# Patient Record
Sex: FEMALE | Race: WHITE | NOT HISPANIC OR LATINO | URBAN - NONMETROPOLITAN AREA
[De-identification: names, ages, dates, MRNs, and addresses within clinical notes are randomized per-mention and may not be internally consistent; named-entity substitution may affect disease eponyms.]

---

## 2020-06-01 ENCOUNTER — OFFICE VISIT (OUTPATIENT)
Dept: FAMILY MEDICINE CLINIC | Facility: CLINIC | Age: 50
End: 2020-06-01

## 2020-06-01 VITALS
DIASTOLIC BLOOD PRESSURE: 70 MMHG | TEMPERATURE: 97.3 F | HEIGHT: 67 IN | HEART RATE: 87 BPM | WEIGHT: 179 LBS | BODY MASS INDEX: 28.09 KG/M2 | SYSTOLIC BLOOD PRESSURE: 140 MMHG | OXYGEN SATURATION: 99 %

## 2020-06-01 DIAGNOSIS — G89.29 CHRONIC BILATERAL LOW BACK PAIN WITH RIGHT-SIDED SCIATICA: ICD-10-CM

## 2020-06-01 DIAGNOSIS — Z13.220 SCREENING FOR HYPERLIPIDEMIA: ICD-10-CM

## 2020-06-01 DIAGNOSIS — Z13.29 SCREENING FOR THYROID DISORDER: ICD-10-CM

## 2020-06-01 DIAGNOSIS — Z76.89 ENCOUNTER TO ESTABLISH CARE WITH NEW DOCTOR: Primary | ICD-10-CM

## 2020-06-01 DIAGNOSIS — R93.7 ABNORMAL X-RAY OF LUMBAR SPINE: ICD-10-CM

## 2020-06-01 DIAGNOSIS — M25.551 RIGHT HIP PAIN: ICD-10-CM

## 2020-06-01 DIAGNOSIS — Z11.59 NEED FOR HEPATITIS C SCREENING TEST: ICD-10-CM

## 2020-06-01 DIAGNOSIS — M54.41 CHRONIC BILATERAL LOW BACK PAIN WITH RIGHT-SIDED SCIATICA: ICD-10-CM

## 2020-06-01 DIAGNOSIS — R53.82 CHRONIC FATIGUE: ICD-10-CM

## 2020-06-01 DIAGNOSIS — Z01.419 WOMEN'S ANNUAL ROUTINE GYNECOLOGICAL EXAMINATION: ICD-10-CM

## 2020-06-01 PROCEDURE — 99203 OFFICE O/P NEW LOW 30 MIN: CPT | Performed by: FAMILY MEDICINE

## 2020-06-01 NOTE — PATIENT INSTRUCTIONS
Alcohol Use Disorder  Alcohol use disorder is when your drinking disrupts your daily life. When you have this condition, you drink too much alcohol and you cannot control your drinking.  Alcohol use disorder can cause serious problems with your physical health. It can affect your brain, heart, liver, pancreas, immune system, stomach, and intestines. Alcohol use disorder can increase your risk for certain cancers and cause problems with your mental health, such as depression, anxiety, psychosis, delirium, and dementia. People with this disorder risk hurting themselves and others.  What are the causes?  This condition is caused by drinking too much alcohol over time. It is not caused by drinking too much alcohol only one or two times. Some people with this condition drink alcohol to cope with or escape from negative life events. Others drink to relieve pain or symptoms of mental illness.  What increases the risk?  You are more likely to develop this condition if:  · You have a family history of alcohol use disorder.  · Your culture encourages drinking to the point of intoxication, or makes alcohol easy to get.  · You had a mood or conduct disorder in childhood.  · You have been a victim of abuse.  · You are an adolescent and:  ? You have poor grades or difficulties in school.  ? Your caregivers do not talk to you about saying no to alcohol, or supervise your activities.  ? You are impulsive or you have trouble with self-control.  What are the signs or symptoms?  Symptoms of this condition include:  · Drinking more than you want to.  · Drinking for longer than you want to.  · Trying several times to drink less or to control your drinking.  · Spending a lot of time getting alcohol, drinking, or recovering from drinking.  · Craving alcohol.  · Having problems at work, at school, or at home due to drinking.  · Having problems in relationships due to drinking.  · Drinking when it is dangerous to drink, such as before  driving a car.  · Continuing to drink even though you know you might have a physical or mental problem related to drinking.  · Needing more and more alcohol to get the same effect you want from the alcohol (building up tolerance).  · Having symptoms of withdrawal when you stop drinking. Symptoms of withdrawal include:  ? Fatigue.  ? Nightmares.  ? Trouble sleeping.  ? Depression.  ? Anxiety.  ? Fever.  ? Seizures.  ? Severe confusion.  ? Feeling or seeing things that are not there (hallucinations).  ? Tremors.  ? Rapid heart rate.  ? Rapid breathing.  ? High blood pressure.  · Drinking to avoid symptoms of withdrawal.  How is this diagnosed?  This condition is diagnosed with an assessment. Your health care provider may start the assessment by asking three or four questions about your drinking.  Your health care provider may perform a physical exam or do lab tests to see if you have physical problems resulting from alcohol use. She or he may refer you to a mental health professional for evaluation.  How is this treated?  Some people with alcohol use disorder are able to reduce their alcohol use to low-risk levels. Others need to completely quit drinking alcohol. When necessary, mental health professionals with specialized training in substance use treatment can help. Your health care provider can help you decide how severe your alcohol use disorder is and what type of treatment you need. The following forms of treatment are available:  · Detoxification. Detoxification involves quitting drinking and using prescription medicines within the first week to help lessen withdrawal symptoms. This treatment is important for people who have had withdrawal symptoms before and for heavy drinkers who are likely to have withdrawal symptoms. Alcohol withdrawal can be dangerous, and in severe cases, it can cause death. Detoxification may be provided in a home, community, or primary care setting, or in a hospital or substance use  treatment facility.  · Counseling. This treatment is also called talk therapy. It is provided by substance use treatment counselors. A counselor can address the reasons you use alcohol and suggest ways to keep you from drinking again or to prevent problem drinking. The goals of talk therapy are to:  ? Find healthy activities and ways for you to cope with stress.  ? Identify and avoid the things that trigger your alcohol use.  ? Help you learn how to handle cravings.  · Medicines. Medicines can help treat alcohol use disorder by:  ? Decreasing alcohol cravings.  ? Decreasing the positive feeling you have when you drink alcohol.  ? Causing an uncomfortable physical reaction when you drink alcohol (aversion therapy).  · Support groups. Support groups are led by people who have quit drinking. They provide emotional support, advice, and guidance.  These forms of treatment are often combined. Some people with this condition benefit from a combination of treatments provided by specialized substance use treatment centers.  Follow these instructions at home:  · Take over-the-counter and prescription medicines only as told by your health care provider.  · Check with your health care provider before starting any new medicines.  · Ask friends and family members not to offer you alcohol.  · Avoid situations where alcohol is served, including gatherings where others are drinking alcohol.  · Create a plan for what to do when you are tempted to use alcohol.  · Find hobbies or activities that you enjoy that do not include alcohol.  · Keep all follow-up visits as told by your health care provider. This is important.  How is this prevented?  · If you drink, limit alcohol intake to no more than 1 drink a day for nonpregnant women and 2 drinks a day for men. One drink equals 12 oz of beer, 5 oz of wine, or 1½ oz of hard liquor.  · If you have a mental health condition, get treatment and support.  · Do not give alcohol to  adolescents.  · If you are an adolescent:  ? Do not drink alcohol.  ? Do not be afraid to say no if someone offers you alcohol. Speak up about why you do not want to drink. You can be a positive role model for your friends and set a good example for those around you by not drinking alcohol.  ? If your friends drink, spend time with others who do not drink alcohol. Make new friends who do not use alcohol.  ? Find healthy ways to manage stress and emotions, such as meditation or deep breathing, exercise, spending time in nature, listening to music, or talking with a trusted friend or family member.  Contact a health care provider if:  · You are not able to take your medicines as told.  · Your symptoms get worse.  · You return to drinking alcohol (relapse) and your symptoms get worse.  Get help right away if:  · You have thoughts about hurting yourself or others.  If you ever feel like you may hurt yourself or others, or have thoughts about taking your own life, get help right away. You can go to your nearest emergency department or call:  · Your local emergency services (911 in the [place]  · A suicide crisis helpline, such as the National Suicide Prevention Lifeline at 1-178.720.3832. This is open 24 hours a day.  Summary  · Alcohol use disorder is when your drinking disrupts your daily life. When you have this condition, you drink too much alcohol and you cannot control your drinking.  · Treatment may include detoxification, counseling, medicine, and support groups.  · Ask friends and family members not to offer you alcohol. Avoid situations where alcohol is served.  · Get help right away if you have thoughts about hurting yourself or others.  This information is not intended to replace advice given to you by your health care provider. Make sure you discuss any questions you have with your health care provider.  Document Released: 01/25/2006 Document Revised: 11/30/2018 Document Reviewed: 09/14/2017  Elsevier Patient  Education © 2020 [employer]  Smoking Tobacco Information, Adult  Smoking tobacco can be harmful to your health. Tobacco contains a poisonous (toxic), colorless chemical called nicotine. Nicotine is addictive. It changes the brain and can make it hard to stop smoking. Tobacco also has other toxic chemicals that can hurt your body and raise your risk of many cancers.  How can smoking tobacco affect me?  Smoking tobacco puts you at risk for:  · Cancer. Smoking is most commonly associated with lung cancer, but can also lead to cancer in other parts of the body.  · Chronic obstructive pulmonary disease (COPD). This is a long-term lung condition that makes it hard to breathe. It also gets worse over time.  · High blood pressure (hypertension), heart disease, stroke, or heart attack.  · Lung infections, such as pneumonia.  · Cataracts. This is when the lenses in the eyes become clouded.  · Digestive problems. This may include peptic ulcers, heartburn, and gastroesophageal reflux disease (GERD).  · Oral health problems, such as gum disease and tooth loss.  · Loss of taste and smell.  Smoking can affect your appearance by causing:  · Wrinkles.  · Yellow or stained teeth, fingers, and fingernails.  Smoking tobacco can also affect your social life, because:  · It may be challenging to find places to smoke when away from home. Many workplaces, restaurants, hotels, and public places are tobacco-free.  · Smoking is expensive. This is due to the cost of tobacco and the long-term costs of treating health problems from smoking.  · Secondhand smoke may affect those around you. Secondhand smoke can cause lung cancer, breathing problems, and heart disease. Children of smokers have a higher risk for:  ? Sudden infant death syndrome (SIDS).  ? Ear infections.  ? Lung infections.  If you currently smoke tobacco, quitting now can help you:  · Lead a longer and healthier life.  · Look, smell, breathe, and feel better over time.  · Save  money.  · Protect others from the harms of secondhand smoke.  What actions can I take to prevent health problems?  Quit smoking    · Do not start smoking. Quit if you already do.  · Make a plan to quit smoking and commit to it. Look for programs to help you and ask your health care provider for recommendations and ideas.  · Set a date and write down all the reasons you want to quit.  · Let your friends and family know you are quitting so they can help and support you. Consider finding friends who also want to quit. It can be easier to quit with someone else, so that you can support each other.  · Talk with your health care provider about using nicotine replacement medicines to help you quit, such as gum, lozenges, patches, sprays, or pills.  · Do not replace cigarette smoking with electronic cigarettes, which are commonly called e-cigarettes. The safety of e-cigarettes is not known, and some may contain harmful chemicals.  · If you try to quit but return to smoking, stay positive. It is common to slip up when you first quit, so take it one day at a time.  · Be prepared for cravings. When you feel the urge to smoke, chew gum or suck on hard candy.  Lifestyle  · Stay busy and take care of your body.  · Drink enough fluid to keep your urine pale yellow.  · Get plenty of exercise and eat a healthy diet. This can help prevent weight gain after quitting.  · Monitor your eating habits. Quitting smoking can cause you to have a larger appetite than when you smoke.  · Find ways to relax. Go out with friends or family to a movie or a restaurant where people do not smoke.  · Ask your health care provider about having regular tests (screenings) to check for cancer. This may include blood tests, imaging tests, and other tests.  · Find ways to manage your stress, such as meditation, yoga, or exercise.  Where to find support  To get support to quit smoking, consider:  · Asking your health care provider for more information and  resources.  · Taking classes to learn more about quitting smoking.  · Looking for local organizations that offer resources about quitting smoking.  · Joining a support group for people who want to quit smoking in your local community.  · Calling the smokefree.gov counselor helpline: 1-800-Quit-Now (1-308.642.8044)  Where to find more information  You may find more information about quitting smoking from:  · HelpGuide.org: www.helpguide.org  · Smokefree.gov: smokefree.gov  · American Lung Association: www.lung.org  Contact a health care provider if you:  · Have problems breathing.  · Notice that your lips, nose, or fingers turn blue.  · Have chest pain.  · Are coughing up blood.  · Feel faint or you pass out.  · Have other health changes that cause you to worry.  Summary  · Smoking tobacco can negatively affect your health, the health of those around you, your finances, and your social life.  · Do not start smoking. Quit if you already do. If you need help quitting, ask your health care provider.  · Think about joining a support group for people who want to quit smoking in your local community. There are many effective programs that will help you to quit this behavior.  This information is not intended to replace advice given to you by your health care provider. Make sure you discuss any questions you have with your health care provider.  Document Released: 01/02/2018 Document Revised: 02/06/2019 Document Reviewed: 01/02/2018  Elsevier Patient Education © 2020 [employer]

## 2020-06-01 NOTE — PROGRESS NOTES
Chief Complaint   Patient presents with   • Back Pain   • Establish Care       Subjective:  Sangita Pradhan is a 49 y.o. female who presents today to establish care and discuss chronic low back and right hip pain.  Patient has not seen a primary care provider in some time and is due for all routine screenings.  Her medical history is fairly unremarkable and she denies history other than chronic low back pain.  She reports her chronic back pain is been present for greater than 10 years but has been worse over the last several months.  She does not recall injuring her back or do anything to exacerbate this.  She did previously work at [employer] and reports that prolonged standing and activity at work caused her symptoms to be worse.  Pain radiates across her lower back and down to her right buttocks and around to right groin.  It does occasionally radiate down her leg but stops above the knee.  It is achy and sharp in nature.  She rates it as a 2 out of 10 currently but states when it is severe it gets as bad as a 10 out of 10.  It is present constantly but waxes and wanes in severity.  She denies any weakness or numbness or tingling.  Denies any bowel or bladder incontinence.  No saddle or groin anesthesia.  She has been using heat with some relief of her pain.  She is also using diclofenac that she got at the urgent care.  She was seen at the urgent care here at [facility] in May 2020 and had an x-ray that showed degenerative disease changes in her lumbar spine.  She was started on diclofenac which has helped some but has not resolved her pain.  She has not had any further imaging.  She has never tried physical therapy.    Patient is due for all screening lab work at this time.  She is also due for a Pap as she has not had one in over 10 years.  Patient does smoke 1 pack/day and has done so for greater than 30 years.  She does not wish to quit at this time.  She does drink around 18 beers every weekend.  Reports  "she does not drink during the week but binge drinks on the weekend.  She has been doing this fairly regularly for some time.  She is not interested in quitting drinking or smoking at this time.    She has no further concerns or questions at this time.      The following portions of the patient's history were reviewed and updated as appropriate: allergies, current medications, past family history, past medical history, past social history, past surgical history and problem list.      Past Medical History:   Diagnosis Date   • Chronic low back pain          Current Outpatient Medications:   •  diclofenac (VOLTAREN) 50 MG EC tablet, Take 1 tablet by mouth 2 (Two) Times a Day As Needed (back pain)., Disp: 60 tablet, Rfl: 0    Review of Systems    Review of Systems   Constitutional: Negative for activity change, chills, fever and unexpected weight change.   HENT: Negative for hearing loss, rhinorrhea and sore throat.    Eyes: Negative for visual disturbance.   Respiratory: Negative for cough and shortness of breath.    Cardiovascular: Negative for chest pain, palpitations and leg swelling.   Gastrointestinal: Negative for abdominal pain, blood in stool, constipation, diarrhea, nausea and vomiting.   Endocrine: Negative for polydipsia, polyphagia and polyuria.   Genitourinary: Negative for dysuria, frequency, hematuria and urgency.   Musculoskeletal: Positive for back pain. Negative for arthralgias, gait problem, joint swelling and myalgias.        Chronic right hip pain   Skin: Negative for rash.   Neurological: Negative for dizziness, light-headedness, numbness and headaches.   Psychiatric/Behavioral: Negative for sleep disturbance and suicidal ideas.       Objective  Vitals:    06/01/20 1036   BP: 140/70   BP Location: Left arm   Patient Position: Sitting   Pulse: 87   Temp: 97.3 °F (36.3 °C)   TempSrc: Tympanic   SpO2: 99%   Weight: 81.2 kg (179 lb)   Height: 170.2 cm (67\")   PainSc:   2   PainLoc: Hip       Physical " Exam   Constitutional: She is oriented to person, place, and time. Vital signs are normal. She appears well-developed and well-nourished. She is active.  Non-toxic appearance. She does not have a sickly appearance. She does not appear ill. No distress.   HENT:   Head: Normocephalic and atraumatic.   Right Ear: Hearing and external ear normal.   Left Ear: Hearing and external ear normal.   Nose: Nose normal.   Eyes: Pupils are equal, round, and reactive to light. Conjunctivae, EOM and lids are normal. Right eye exhibits no discharge. Left eye exhibits no discharge. No scleral icterus. Right eye exhibits normal extraocular motion and no nystagmus. Left eye exhibits normal extraocular motion and no nystagmus.   Neck: Trachea normal, normal range of motion, full passive range of motion without pain and phonation normal. Neck supple. No tracheal tenderness present. No neck rigidity. No tracheal deviation, no edema, no erythema and normal range of motion present. No thyroid mass and no thyromegaly present.   Cardiovascular: Normal rate, regular rhythm, S1 normal, S2 normal and normal heart sounds. PMI is not displaced. Exam reveals no gallop, no S3, no S4, no distant heart sounds and no friction rub.   No murmur heard.   No systolic murmur is present.   No diastolic murmur is present.  Pulmonary/Chest: Effort normal and breath sounds normal. No accessory muscle usage or stridor. No apnea, no tachypnea and no bradypnea. No respiratory distress. She has no decreased breath sounds. She has no wheezes. She has no rhonchi. She has no rales.   Abdominal: Normal appearance.   Musculoskeletal: She exhibits no edema.        Right hip: She exhibits tenderness. She exhibits normal range of motion, normal strength, no bony tenderness, no swelling, no crepitus, no deformity and no laceration.        Lumbar back: She exhibits tenderness and pain. She exhibits normal range of motion, no bony tenderness, no swelling, no edema, no  deformity, no laceration, no spasm and normal pulse.   Positive leg raise test on the right.  Negative leg raise test on the left.   Lymphadenopathy:     She has no cervical adenopathy.   Neurological: She is alert and oriented to person, place, and time. She has normal strength. She is not disoriented. She displays no atrophy and no tremor. No cranial nerve deficit or sensory deficit. She exhibits normal muscle tone. She displays no seizure activity. Coordination and gait normal.   Reflex Scores:       Patellar reflexes are 2+ on the right side and 2+ on the left side.  Strength 5 out of 5 throughout lower extremities bilaterally.   Skin: Skin is warm and dry. No rash noted. She is not diaphoretic. No erythema.   Psychiatric: She has a normal mood and affect. Her behavior is normal. Judgment and thought content normal.   Nursing note and vitals reviewed.        Sangita was seen today for back pain and establish care.    Diagnoses and all orders for this visit:    Encounter to establish care with new doctor  Complete medical history, surgical history, social history, and family history reviewed.  Recent imaging and notes reviewed.    Chronic bilateral low back pain with right-sided sciatica  Continue conservative therapy with NSAIDs and heat at this time.  Discussed adding stretches and strengthening exercises.  Referral to physical therapy made for further evaluation and treatment.  We will go ahead and order MRI of lumbar spine due to abnormal findings on x-ray with radicular symptoms.  Will make further recommendation based on MRI findings as well as response to physical therapy.  Discussed risks of NSAIDs including increased risks of vascular disease, renal injury, and GI irritation.  Discussed taking these with food if GI issues occur and drinking plenty fluids stay well-hydrated.  Take only as needed.  Patient verbalized understanding and is agreeable with plan.  -     Ambulatory Referral to Physical Therapy  Evaluate and treat; Stretching, ROM, Strengthening  -     MRI Lumbar Spine Without Contrast; Future    Abnormal x-ray of lumbar spine  -     MRI Lumbar Spine Without Contrast; Future    Right hip pain  -     XR Hip With or Without Pelvis 2 - 3 View Right; Future    Chronic fatigue  -     CBC w AUTO Differential; Future  -     Comprehensive metabolic panel; Future  -     TSH; Future    Screening for thyroid disorder  -     TSH; Future    Screening for hyperlipidemia  -     Lipid panel; Future    Need for hepatitis C screening test  -     Hepatitis C antibody; Future    Women's annual routine gynecological examination  -     Ambulatory Referral to Gynecology    Tobacco abuse disorder  Discussed risks of tobacco use.  Discussed cessation methods available.  Patient declines cessation at this time.  Will follow and continue to encourage patient to quit smoking in the future.  Approximately 5 minutes was used counseling patient.    Alcohol abuse disorder  Discussed risks of alcohol abuse.  Discussed CDC recommendation of one 12 oz. beer daily for females.  Discussed need to decrease binge drinking.  Patient verbalized understanding but does not wish to get nondrinking at this time.    I spent 35 minutes in direct face to face contact with patient.  Greater than 50% of this time was spent counseling patient and discussing plan of care.    PHQ-2/PHQ-9 Depression Screening 6/1/2020   Little interest or pleasure in doing things 1   Feeling down, depressed, or hopeless 0   Total Score 1           This document has been electronically signed by Da Epperson DO on June 1, 2020 16:05

## 2020-06-03 ENCOUNTER — HOSPITAL ENCOUNTER (OUTPATIENT)
Dept: MRI IMAGING | Facility: HOSPITAL | Age: 50
Discharge: HOME | End: 2020-06-03
Admitting: FAMILY MEDICINE

## 2020-06-03 ENCOUNTER — LAB (OUTPATIENT)
Dept: LAB | Facility: HOSPITAL | Age: 50
End: 2020-06-03

## 2020-06-03 ENCOUNTER — TELEPHONE (OUTPATIENT)
Dept: FAMILY MEDICINE CLINIC | Facility: CLINIC | Age: 50
End: 2020-06-03

## 2020-06-03 DIAGNOSIS — R53.82 CHRONIC FATIGUE: ICD-10-CM

## 2020-06-03 DIAGNOSIS — Z13.220 SCREENING FOR HYPERLIPIDEMIA: ICD-10-CM

## 2020-06-03 DIAGNOSIS — M54.41 CHRONIC BILATERAL LOW BACK PAIN WITH RIGHT-SIDED SCIATICA: ICD-10-CM

## 2020-06-03 DIAGNOSIS — Z11.59 NEED FOR HEPATITIS C SCREENING TEST: ICD-10-CM

## 2020-06-03 DIAGNOSIS — Z13.29 SCREENING FOR THYROID DISORDER: ICD-10-CM

## 2020-06-03 DIAGNOSIS — R93.7 ABNORMAL X-RAY OF LUMBAR SPINE: ICD-10-CM

## 2020-06-03 DIAGNOSIS — G89.29 CHRONIC BILATERAL LOW BACK PAIN WITH RIGHT-SIDED SCIATICA: ICD-10-CM

## 2020-06-03 PROCEDURE — 72148 MRI LUMBAR SPINE W/O DYE: CPT

## 2020-06-03 PROCEDURE — 84443 ASSAY THYROID STIM HORMONE: CPT

## 2020-06-03 PROCEDURE — 36415 COLL VENOUS BLD VENIPUNCTURE: CPT

## 2020-06-03 PROCEDURE — 80053 COMPREHEN METABOLIC PANEL: CPT

## 2020-06-03 PROCEDURE — 80061 LIPID PANEL: CPT

## 2020-06-03 PROCEDURE — 85025 COMPLETE CBC W/AUTO DIFF WBC: CPT

## 2020-06-03 PROCEDURE — 86803 HEPATITIS C AB TEST: CPT

## 2020-06-04 LAB
ALBUMIN SERPL-MCNC: 4 G/DL (ref 3.5–5.2)
ALBUMIN/GLOB SERPL: 1.4 G/DL
ALP SERPL-CCNC: 55 U/L (ref 39–117)
ALT SERPL W P-5'-P-CCNC: 10 U/L (ref 1–33)
ANION GAP SERPL CALCULATED.3IONS-SCNC: 10.7 MMOL/L (ref 5–15)
AST SERPL-CCNC: 14 U/L (ref 1–32)
BASOPHILS # BLD AUTO: 0.08 10*3/MM3 (ref 0–0.2)
BASOPHILS NFR BLD AUTO: 0.6 % (ref 0–1.5)
BILIRUB SERPL-MCNC: <0.2 MG/DL (ref 0.2–1.2)
BUN BLD-MCNC: 10 MG/DL (ref 6–20)
BUN/CREAT SERPL: 14.3 (ref 7–25)
CALCIUM SPEC-SCNC: 9 MG/DL (ref 8.6–10.5)
CHLORIDE SERPL-SCNC: 107 MMOL/L (ref 98–107)
CHOLEST SERPL-MCNC: 156 MG/DL (ref 0–200)
CO2 SERPL-SCNC: 21.3 MMOL/L (ref 22–29)
CREAT BLD-MCNC: 0.7 MG/DL (ref 0.57–1)
DEPRECATED RDW RBC AUTO: 47.7 FL (ref 37–54)
EOSINOPHIL # BLD AUTO: 0.15 10*3/MM3 (ref 0–0.4)
EOSINOPHIL NFR BLD AUTO: 1.2 % (ref 0.3–6.2)
ERYTHROCYTE [DISTWIDTH] IN BLOOD BY AUTOMATED COUNT: 13.8 % (ref 12.3–15.4)
GFR SERPL CREATININE-BSD FRML MDRD: 89 ML/MIN/1.73
GLOBULIN UR ELPH-MCNC: 2.8 GM/DL
GLUCOSE BLD-MCNC: 104 MG/DL (ref 65–99)
HCT VFR BLD AUTO: 41.4 % (ref 34–46.6)
HCV AB SER DONR QL: NORMAL
HDLC SERPL-MCNC: 56 MG/DL (ref 40–60)
HGB BLD-MCNC: 14.1 G/DL (ref 12–15.9)
IMM GRANULOCYTES # BLD AUTO: 0.04 10*3/MM3 (ref 0–0.05)
IMM GRANULOCYTES NFR BLD AUTO: 0.3 % (ref 0–0.5)
LDLC SERPL CALC-MCNC: 82 MG/DL (ref 0–100)
LDLC/HDLC SERPL: 1.47 {RATIO}
LYMPHOCYTES # BLD AUTO: 3.51 10*3/MM3 (ref 0.7–3.1)
LYMPHOCYTES NFR BLD AUTO: 28.2 % (ref 19.6–45.3)
MCH RBC QN AUTO: 31.8 PG (ref 26.6–33)
MCHC RBC AUTO-ENTMCNC: 34.1 G/DL (ref 31.5–35.7)
MCV RBC AUTO: 93.2 FL (ref 79–97)
MONOCYTES # BLD AUTO: 0.82 10*3/MM3 (ref 0.1–0.9)
MONOCYTES NFR BLD AUTO: 6.6 % (ref 5–12)
NEUTROPHILS # BLD AUTO: 7.83 10*3/MM3 (ref 1.7–7)
NEUTROPHILS NFR BLD AUTO: 63.1 % (ref 42.7–76)
NRBC BLD AUTO-RTO: 0 /100 WBC (ref 0–0.2)
PLATELET # BLD AUTO: 392 10*3/MM3 (ref 140–450)
PMV BLD AUTO: 9.7 FL (ref 6–12)
POTASSIUM BLD-SCNC: 5.2 MMOL/L (ref 3.5–5.2)
PROT SERPL-MCNC: 6.8 G/DL (ref 6–8.5)
RBC # BLD AUTO: 4.44 10*6/MM3 (ref 3.77–5.28)
SODIUM BLD-SCNC: 139 MMOL/L (ref 136–145)
TRIGL SERPL-MCNC: 89 MG/DL (ref 0–150)
TSH SERPL DL<=0.05 MIU/L-ACNC: 0.74 UIU/ML (ref 0.27–4.2)
VLDLC SERPL-MCNC: 17.8 MG/DL (ref 5–40)
WBC NRBC COR # BLD: 12.43 10*3/MM3 (ref 3.4–10.8)

## 2020-06-11 ENCOUNTER — HOSPITAL ENCOUNTER (EMERGENCY)
Facility: HOSPITAL | Age: 50
Discharge: HOME | End: 2020-06-11
Attending: EMERGENCY MEDICINE | Admitting: EMERGENCY MEDICINE

## 2020-06-11 ENCOUNTER — APPOINTMENT (OUTPATIENT)
Dept: CT IMAGING | Facility: HOSPITAL | Age: 50
End: 2020-06-11

## 2020-06-11 VITALS
TEMPERATURE: 98.2 F | WEIGHT: 180 LBS | OXYGEN SATURATION: 98 % | RESPIRATION RATE: 18 BRPM | SYSTOLIC BLOOD PRESSURE: 161 MMHG | HEIGHT: 67 IN | HEART RATE: 63 BPM | BODY MASS INDEX: 28.25 KG/M2 | DIASTOLIC BLOOD PRESSURE: 62 MMHG

## 2020-06-11 DIAGNOSIS — N20.0 KIDNEY STONES: ICD-10-CM

## 2020-06-11 DIAGNOSIS — R11.10 HYPEREMESIS: Primary | ICD-10-CM

## 2020-06-11 DIAGNOSIS — R10.13 EPIGASTRIC PAIN: ICD-10-CM

## 2020-06-11 LAB
ALBUMIN SERPL-MCNC: 3.9 G/DL (ref 3.5–5.2)
ALBUMIN/GLOB SERPL: 1.3 G/DL
ALP SERPL-CCNC: 54 U/L (ref 39–117)
ALT SERPL W P-5'-P-CCNC: 12 U/L (ref 1–33)
ANION GAP SERPL CALCULATED.3IONS-SCNC: 14 MMOL/L (ref 5–15)
AST SERPL-CCNC: 14 U/L (ref 1–32)
BASOPHILS # BLD AUTO: 0.05 10*3/MM3 (ref 0–0.2)
BASOPHILS NFR BLD AUTO: 0.2 % (ref 0–1.5)
BILIRUB SERPL-MCNC: 0.3 MG/DL (ref 0.2–1.2)
BILIRUB UR QL STRIP: NEGATIVE
BUN BLD-MCNC: 11 MG/DL (ref 6–20)
BUN/CREAT SERPL: 17.7 (ref 7–25)
CALCIUM SPEC-SCNC: 9 MG/DL (ref 8.6–10.5)
CHLORIDE SERPL-SCNC: 104 MMOL/L (ref 98–107)
CLARITY UR: CLEAR
CO2 SERPL-SCNC: 21 MMOL/L (ref 22–29)
COLOR UR: YELLOW
CREAT BLD-MCNC: 0.62 MG/DL (ref 0.57–1)
DEPRECATED RDW RBC AUTO: 48.9 FL (ref 37–54)
EOSINOPHIL # BLD AUTO: 0.01 10*3/MM3 (ref 0–0.4)
EOSINOPHIL NFR BLD AUTO: 0 % (ref 0.3–6.2)
ERYTHROCYTE [DISTWIDTH] IN BLOOD BY AUTOMATED COUNT: 14.6 % (ref 12.3–15.4)
GFR SERPL CREATININE-BSD FRML MDRD: 102 ML/MIN/1.73
GLOBULIN UR ELPH-MCNC: 2.9 GM/DL
GLUCOSE BLD-MCNC: 149 MG/DL (ref 65–99)
GLUCOSE UR STRIP-MCNC: ABNORMAL MG/DL
HCG SERPL QL: NEGATIVE
HCT VFR BLD AUTO: 39.4 % (ref 34–46.6)
HGB BLD-MCNC: 13.7 G/DL (ref 12–15.9)
HGB UR QL STRIP.AUTO: NEGATIVE
IMM GRANULOCYTES # BLD AUTO: 0.15 10*3/MM3 (ref 0–0.05)
IMM GRANULOCYTES NFR BLD AUTO: 0.7 % (ref 0–0.5)
KETONES UR QL STRIP: ABNORMAL
LEUKOCYTE ESTERASE UR QL STRIP.AUTO: NEGATIVE
LIPASE SERPL-CCNC: 14 U/L (ref 13–60)
LYMPHOCYTES # BLD AUTO: 1.64 10*3/MM3 (ref 0.7–3.1)
LYMPHOCYTES NFR BLD AUTO: 7.8 % (ref 19.6–45.3)
MCH RBC QN AUTO: 31.9 PG (ref 26.6–33)
MCHC RBC AUTO-ENTMCNC: 34.8 G/DL (ref 31.5–35.7)
MCV RBC AUTO: 91.8 FL (ref 79–97)
MONOCYTES # BLD AUTO: 1.04 10*3/MM3 (ref 0.1–0.9)
MONOCYTES NFR BLD AUTO: 5 % (ref 5–12)
NEUTROPHILS # BLD AUTO: 18.12 10*3/MM3 (ref 1.7–7)
NEUTROPHILS NFR BLD AUTO: 86.3 % (ref 42.7–76)
NITRITE UR QL STRIP: NEGATIVE
NRBC BLD AUTO-RTO: 0 /100 WBC (ref 0–0.2)
PH UR STRIP.AUTO: 6 [PH] (ref 5–9)
PLATELET # BLD AUTO: 389 10*3/MM3 (ref 140–450)
PMV BLD AUTO: 8.8 FL (ref 6–12)
POTASSIUM BLD-SCNC: 3.3 MMOL/L (ref 3.5–5.2)
PROT SERPL-MCNC: 6.8 G/DL (ref 6–8.5)
PROT UR QL STRIP: ABNORMAL
RBC # BLD AUTO: 4.29 10*6/MM3 (ref 3.77–5.28)
SODIUM BLD-SCNC: 139 MMOL/L (ref 136–145)
SP GR UR STRIP: 1.07 (ref 1–1.03)
UROBILINOGEN UR QL STRIP: ABNORMAL
WBC NRBC COR # BLD: 21.01 10*3/MM3 (ref 3.4–10.8)

## 2020-06-11 PROCEDURE — 96375 TX/PRO/DX INJ NEW DRUG ADDON: CPT

## 2020-06-11 PROCEDURE — 84703 CHORIONIC GONADOTROPIN ASSAY: CPT | Performed by: STUDENT IN AN ORGANIZED HEALTH CARE EDUCATION/TRAINING PROGRAM

## 2020-06-11 PROCEDURE — 99284 EMERGENCY DEPT VISIT MOD MDM: CPT

## 2020-06-11 PROCEDURE — 85025 COMPLETE CBC W/AUTO DIFF WBC: CPT | Performed by: STUDENT IN AN ORGANIZED HEALTH CARE EDUCATION/TRAINING PROGRAM

## 2020-06-11 PROCEDURE — 36415 COLL VENOUS BLD VENIPUNCTURE: CPT | Performed by: STUDENT IN AN ORGANIZED HEALTH CARE EDUCATION/TRAINING PROGRAM

## 2020-06-11 PROCEDURE — 83690 ASSAY OF LIPASE: CPT | Performed by: STUDENT IN AN ORGANIZED HEALTH CARE EDUCATION/TRAINING PROGRAM

## 2020-06-11 PROCEDURE — 96376 TX/PRO/DX INJ SAME DRUG ADON: CPT

## 2020-06-11 PROCEDURE — 25010000002 MORPHINE PER 10 MG: Performed by: STUDENT IN AN ORGANIZED HEALTH CARE EDUCATION/TRAINING PROGRAM

## 2020-06-11 PROCEDURE — 80053 COMPREHEN METABOLIC PANEL: CPT | Performed by: STUDENT IN AN ORGANIZED HEALTH CARE EDUCATION/TRAINING PROGRAM

## 2020-06-11 PROCEDURE — 25010000002 IOPAMIDOL 61 % SOLUTION: Performed by: EMERGENCY MEDICINE

## 2020-06-11 PROCEDURE — 74177 CT ABD & PELVIS W/CONTRAST: CPT

## 2020-06-11 PROCEDURE — 81003 URINALYSIS AUTO W/O SCOPE: CPT | Performed by: STUDENT IN AN ORGANIZED HEALTH CARE EDUCATION/TRAINING PROGRAM

## 2020-06-11 PROCEDURE — 25010000002 ONDANSETRON PER 1 MG: Performed by: STUDENT IN AN ORGANIZED HEALTH CARE EDUCATION/TRAINING PROGRAM

## 2020-06-11 PROCEDURE — 96374 THER/PROPH/DIAG INJ IV PUSH: CPT

## 2020-06-11 PROCEDURE — 25010000002 MORPHINE PER 10 MG: Performed by: EMERGENCY MEDICINE

## 2020-06-11 RX ORDER — LIDOCAINE HYDROCHLORIDE 20 MG/ML
15 SOLUTION OROPHARYNGEAL ONCE
Status: DISCONTINUED | OUTPATIENT
Start: 2020-06-11 | End: 2020-06-11 | Stop reason: HOSPADM

## 2020-06-11 RX ORDER — ONDANSETRON 2 MG/ML
4 INJECTION INTRAMUSCULAR; INTRAVENOUS ONCE
Status: COMPLETED | OUTPATIENT
Start: 2020-06-11 | End: 2020-06-11

## 2020-06-11 RX ORDER — ONDANSETRON 4 MG/1
4 TABLET, FILM COATED ORAL EVERY 8 HOURS PRN
Qty: 9 TABLET | Refills: 0 | Status: SHIPPED | OUTPATIENT
Start: 2020-06-11 | End: 2020-06-22

## 2020-06-11 RX ORDER — ALUMINA, MAGNESIA, AND SIMETHICONE 2400; 2400; 240 MG/30ML; MG/30ML; MG/30ML
15 SUSPENSION ORAL ONCE
Status: DISCONTINUED | OUTPATIENT
Start: 2020-06-11 | End: 2020-06-11

## 2020-06-11 RX ORDER — SODIUM CHLORIDE 0.9 % (FLUSH) 0.9 %
10 SYRINGE (ML) INJECTION AS NEEDED
Status: DISCONTINUED | OUTPATIENT
Start: 2020-06-11 | End: 2020-06-11 | Stop reason: HOSPADM

## 2020-06-11 RX ADMIN — MORPHINE SULFATE 4 MG: 4 INJECTION, SOLUTION INTRAMUSCULAR; INTRAVENOUS at 16:52

## 2020-06-11 RX ADMIN — ONDANSETRON 4 MG: 2 INJECTION INTRAMUSCULAR; INTRAVENOUS at 16:44

## 2020-06-11 RX ADMIN — SODIUM CHLORIDE 1000 ML: 900 INJECTION, SOLUTION INTRAVENOUS at 16:44

## 2020-06-11 RX ADMIN — ONDANSETRON 4 MG: 2 INJECTION INTRAMUSCULAR; INTRAVENOUS at 19:04

## 2020-06-11 RX ADMIN — IOPAMIDOL 90 ML: 612 INJECTION, SOLUTION INTRAVENOUS at 19:13

## 2020-06-11 RX ADMIN — MORPHINE SULFATE 4 MG: 4 INJECTION, SOLUTION INTRAMUSCULAR; INTRAVENOUS at 19:03

## 2020-06-11 NOTE — ED NOTES
Pt presents to ED with C/O vomiting that began at approx 0600 today. Pt was seen at  prior to ED. Pt reports she ate [employer] last night.      Corinne Aggarwal RN  06/11/20 9133       Corinne Aggarwal RN  06/11/20 4000

## 2020-06-11 NOTE — ED NOTES
Pt vomiting and dry heaving at this time - GI cocktail not given     Corinne Aggarwal, RN  06/11/20 0692

## 2020-06-11 NOTE — ED PROVIDER NOTES
Subjective   Patient states that she ate [employer] last night at midnight, and this morning at 6 AM she started vomiting excessively.  She states that she could not continue to work today and went to the urgent care because she did not stop vomiting.  She has had persistent nausea, complains of abdominal pain that radiates to her back, states the pain is a 10/10, and states she is never had anything like this before.  She has not had any abdominal surgeries.  Patient denies any chest pain, shortness of breath, dizziness/lightheadedness/headache, dysuria, diarrhea/constipation.  She states the pain comes and goes with increased frequency.  Has not been able to tolerate anything p.o.  Nothing makes it better and it stays worse.  Patient states that she has a 40-pack-year smoking history.  She denies any other recreational drug drug use.  She states that she was put on Voltaren for back pain.  And she has an appointment with a doctor to review her lumbar MRI that was recently done on 2020.          Review of Systems   Constitutional: Negative for chills and fever.   HENT: Negative for rhinorrhea and sore throat.    Eyes: Negative for photophobia and visual disturbance.   Respiratory: Negative for cough and shortness of breath.    Cardiovascular: Negative for chest pain and palpitations.   Gastrointestinal: Positive for abdominal pain, nausea and vomiting.   Genitourinary: Negative for difficulty urinating and flank pain.   Musculoskeletal: Positive for back pain. Negative for arthralgias.   Neurological: Negative for dizziness, light-headedness and headaches.   Psychiatric/Behavioral: Negative for confusion. The patient is not nervous/anxious.        Past Medical History:   Diagnosis Date   • Chronic low back pain        Allergies   Allergen Reactions   • Codeine Hives       Past Surgical History:   Procedure Laterality Date   • ADENOIDECTOMY     •  SECTION      x 2    • CYST REMOVAL     • ESSURE TUBAL  LIGATION Bilateral    • TONSILLECTOMY     • WRIST SURGERY Left        Family History   Problem Relation Age of Onset   • Diabetes Mother    • Hyperlipidemia Mother    • Stroke Mother    • Thyroid disease Mother    • Arthritis Father    • Heart disease Father    • No Known Problems Sister    • No Known Problems Brother    • No Known Problems Daughter    • No Known Problems Son    • Breast cancer Maternal Aunt    • Coronary artery disease Paternal Uncle    • No Known Problems Sister    • No Known Problems Son        Social History     Socioeconomic History   • Marital status: Single     Spouse name: Not on file   • Number of children: Not on file   • Years of education: Not on file   • Highest education level: Not on file   Tobacco Use   • Smoking status: Current Every Day Smoker     Packs/day: 1.00     Years: 38.00     Pack years: 38.00   • Smokeless tobacco: Never Used   Substance and Sexual Activity   • Alcohol use: Yes     Alcohol/week: 18.0 standard drinks     Types: 18 Cans of beer per week     Comment: Drinks around 18 beer over the course of weekend    • Drug use: Never   • Sexual activity: Yes     Partners: Male     Birth control/protection: Surgical, Condom   Social History Narrative    Single. Currently not working, but recently worked for [employer]. Fired due to back pain and missing 3 days for this. Lives with daughter Kajal here in [place], [place].  Finished high school.            Objective   Physical Exam   Constitutional: She is oriented to person, place, and time. She appears well-developed and well-nourished. She has a sickly appearance. She appears distressed. Face mask in place.   HENT:   Head: Normocephalic.   Right Ear: Hearing normal.   Left Ear: Hearing normal.   Nose: Nose normal.   Mouth/Throat: Uvula is midline, oropharynx is clear and moist and mucous membranes are normal.   Eyes: Conjunctivae and lids are normal. Right pupil is not reactive. Left pupil is not reactive.   Neck:  Neck supple.   Cardiovascular: Normal rate, regular rhythm, normal heart sounds and intact distal pulses.   Pulmonary/Chest: Effort normal and breath sounds normal. She has no wheezes. She has no rhonchi. She has no rales.   Abdominal: Soft. Normal appearance and bowel sounds are normal. She exhibits no distension. There is tenderness in the right upper quadrant, right lower quadrant, epigastric area and periumbilical area. There is no guarding.   Neurological: She is alert and oriented to person, place, and time.   Skin: Skin is warm.   Psychiatric: She has a normal mood and affect. Her speech is normal and behavior is normal. Judgment and thought content normal. Cognition and memory are normal.   Vitals reviewed.      Procedures     None      ED Course  ED Course as of Jun 11 2007   Thu Jun 11, 2020   1653 CC: Hyperemesis.1000 cc NS bolus, CBC, CMP, hCG Qual, UA, lipase, CT abdomen pelvis with contrast.  Zofran 4 mg IV, morphine 4 mg IV.    [NA]   1856 Patient said improved pain and nausea but still continued. It was reduced. Gave another zofran and morphine dose IV. CT abdomen still pending.     [NA]   1954 CT abdomen had benign findings. Patient stable will discharge home with zofran.     [NA]      ED Course User Index  [NA] Gem Jean MD      Results for orders placed or performed during the hospital encounter of 06/11/20   Comprehensive Metabolic Panel   Result Value Ref Range    Glucose 149 (H) 65 - 99 mg/dL    BUN 11 6 - 20 mg/dL    Creatinine 0.62 0.57 - 1.00 mg/dL    Sodium 139 136 - 145 mmol/L    Potassium 3.3 (L) 3.5 - 5.2 mmol/L    Chloride 104 98 - 107 mmol/L    CO2 21.0 (L) 22.0 - 29.0 mmol/L    Calcium 9.0 8.6 - 10.5 mg/dL    Total Protein 6.8 6.0 - 8.5 g/dL    Albumin 3.90 3.50 - 5.20 g/dL    ALT (SGPT) 12 1 - 33 U/L    AST (SGOT) 14 1 - 32 U/L    Alkaline Phosphatase 54 39 - 117 U/L    Total Bilirubin 0.3 0.2 - 1.2 mg/dL    eGFR Non African Amer 102 >60 mL/min/1.73    Globulin 2.9 gm/dL    A/G  Ratio 1.3 g/dL    BUN/Creatinine Ratio 17.7 7.0 - 25.0    Anion Gap 14.0 5.0 - 15.0 mmol/L   hCG, Serum, Qualitative   Result Value Ref Range    HCG Qualitative Negative Negative   CBC Auto Differential   Result Value Ref Range    WBC 21.01 (H) 3.40 - 10.80 10*3/mm3    RBC 4.29 3.77 - 5.28 10*6/mm3    Hemoglobin 13.7 12.0 - 15.9 g/dL    Hematocrit 39.4 34.0 - 46.6 %    MCV 91.8 79.0 - 97.0 fL    MCH 31.9 26.6 - 33.0 pg    MCHC 34.8 31.5 - 35.7 g/dL    RDW 14.6 12.3 - 15.4 %    RDW-SD 48.9 37.0 - 54.0 fl    MPV 8.8 6.0 - 12.0 fL    Platelets 389 140 - 450 10*3/mm3    Neutrophil % 86.3 (H) 42.7 - 76.0 %    Lymphocyte % 7.8 (L) 19.6 - 45.3 %    Monocyte % 5.0 5.0 - 12.0 %    Eosinophil % 0.0 (L) 0.3 - 6.2 %    Basophil % 0.2 0.0 - 1.5 %    Immature Grans % 0.7 (H) 0.0 - 0.5 %    Neutrophils, Absolute 18.12 (H) 1.70 - 7.00 10*3/mm3    Lymphocytes, Absolute 1.64 0.70 - 3.10 10*3/mm3    Monocytes, Absolute 1.04 (H) 0.10 - 0.90 10*3/mm3    Eosinophils, Absolute 0.01 0.00 - 0.40 10*3/mm3    Basophils, Absolute 0.05 0.00 - 0.20 10*3/mm3    Immature Grans, Absolute 0.15 (H) 0.00 - 0.05 10*3/mm3    nRBC 0.0 0.0 - 0.2 /100 WBC   Lipase   Result Value Ref Range    Lipase 14 13 - 60 U/L     CT Abdomen Pelvis With Contrast  Narrative: CT ABDOMEN AND PELVIS WITH CONTRAST    HISTORY: Abdominal pain. Vomiting.    Following the intravenous administration of contrast, axial  spiral scans of the abdomen and pelvis were obtained.  Coronal  and sagital reconstructions were preformed.    This exam was performed according to our departmental  dose-optimization program, which includes automated exposure  control, adjustment of the mA and/or kV according to patient size  and/or use of iterative reconstruction technique.    DLP: 395.00    COMPARISON: None    FINDINGS:   Scans of the lung bases demonstrate minimal dependent  atelectasis.    No acute osseous abnormality.  Degenerative changes and degenerative disc disease lower thoracic  spine,  L4-5 and L5-S1.  Left paracentral herniated degenerative disc and spondylotic  change L5-S1.    The liver is unremarkable.  The gallbladder is unremarkable.  The spleen is unremarkable.  The pancreas is unremarkable.  The adrenal glands are unremarkable.    Punctate nonobstructive right renal calculus.  No ureteral calculi.  2.6 cm left renal cyst.  No hydronephrosis.    Unremarkable bladder.  No pelvic mass.  Tubal ligation.    No abdominal aortic aneurysm.  Incidental retroaortic left renal vein.  No adenopathy.    Moderate amount retained feces in the colon.  Diverticulosis of the colon.  No bowel obstruction.  Normal appendix.  No free air.  No free fluid.    No hernia.  Impression: CONCLUSION:  Punctate nonobstructive right renal calculus.  2.6 cm left renal cyst.  Tubal ligation.  Moderate amount retained feces in the colon.  Diverticulosis of the colon.  Degenerative changes and degenerative disc disease lower thoracic  spine, L4-5 and L5-S1.  Left paracentral herniated degenerative disc and spondylotic  change L5-S1.    400[address]    Electronically signed by:  Jared Goldstein MD  6/11/2020 7:43 PM CDT  Workstation: [id number]      MDM  Number of Diagnoses or Management Options  Epigastric pain: established and improving  Hyperemesis: established and improving  Kidney stones: established and improving     Amount and/or Complexity of Data Reviewed  Clinical lab tests: reviewed and ordered  Tests in the radiology section of CPT®: reviewed and ordered  Decide to obtain previous medical records or to obtain history from someone other than the patient: yes  Review and summarize past medical records: yes  Discuss the patient with other providers: yes  Independent visualization of images, tracings, or specimens: yes    Risk of Complications, Morbidity, and/or Mortality  Presenting problems: moderate  Diagnostic procedures: moderate  Management options: moderate    Critical Care  Total time providing critical care: 30-74  minutes    Patient Progress  Patient progress: improved      Final diagnoses:   Hyperemesis   Epigastric pain   Kidney stones       This document has been electronically signed by Gem Jean MD on June 11, 2020 20:07     Part of this note may be an electronic transcription/translation of spoken language to printed text using the Dragon Dictation System.          Gem Jean MD  Resident  06/11/20 2007

## 2020-06-22 ENCOUNTER — OFFICE VISIT (OUTPATIENT)
Dept: FAMILY MEDICINE CLINIC | Facility: CLINIC | Age: 50
End: 2020-06-22

## 2020-06-22 VITALS
DIASTOLIC BLOOD PRESSURE: 70 MMHG | WEIGHT: 175 LBS | BODY MASS INDEX: 27.47 KG/M2 | TEMPERATURE: 97.1 F | SYSTOLIC BLOOD PRESSURE: 138 MMHG | HEART RATE: 70 BPM | HEIGHT: 67 IN | OXYGEN SATURATION: 98 %

## 2020-06-22 DIAGNOSIS — M54.41 CHRONIC MIDLINE LOW BACK PAIN WITH RIGHT-SIDED SCIATICA: Primary | ICD-10-CM

## 2020-06-22 DIAGNOSIS — G89.29 CHRONIC MIDLINE LOW BACK PAIN WITH RIGHT-SIDED SCIATICA: Primary | ICD-10-CM

## 2020-06-22 PROCEDURE — 99213 OFFICE O/P EST LOW 20 MIN: CPT | Performed by: FAMILY MEDICINE

## 2020-06-22 RX ORDER — MELOXICAM 15 MG/1
15 TABLET ORAL DAILY
Qty: 30 TABLET | Refills: 2 | Status: SHIPPED | OUTPATIENT
Start: 2020-06-22 | End: 2021-06-30

## 2020-06-22 NOTE — PATIENT INSTRUCTIONS
Chronic Back Pain  When back pain lasts longer than 3 months, it is called chronic back pain. The cause of your back pain may not be known. Some common causes include:  · Wear and tear (degenerative disease) of the bones, ligaments, or disks in your back.  · Inflammation and stiffness in your back (arthritis).  People who have chronic back pain often go through certain periods in which the pain is more intense (flare-ups). Many people can learn to manage the pain with home care.  Follow these instructions at home:  Pay attention to any changes in your symptoms. Take these actions to help with your pain:  Activity    · Avoid bending and other activities that make the problem worse.  · Maintain a proper position when standing or sitting:  ? When standing, keep your upper back and neck straight, with your shoulders pulled back. Avoid slouching.  ? When sitting, keep your back straight and relax your shoulders. Do not round your shoulders or pull them backward.  · Do not sit or  one place for long periods of time.  · Take brief periods of rest throughout the day. This will reduce your pain. Resting in a lying or standing position is usually better than sitting to rest.  · When you are resting for longer periods, mix in some mild activity or stretching between periods of rest. This will help to prevent stiffness and pain.  · Get regular exercise. Ask your health care provider what activities are safe for you.  · Do not lift anything that is heavier than 10 lb (4.5 kg). Always use proper lifting technique, which includes:  ? Bending your knees.  ? Keeping the load close to your body.  ? Avoiding twisting.  · Sleep on a firm mattress in a comfortable position. Try lying on your side with your knees slightly bent. If you lie on your back, put a pillow under your knees.  Managing pain  · If directed, apply ice to the painful area. Your health care provider may recommend applying ice during the first 24-48 hours after  a flare-up begins.  ? Put ice in a plastic bag.  ? Place a towel between your skin and the bag.  ? Leave the ice on for 20 minutes, 2-3 times per day.  · If directed, apply heat to the affected area as often as told by your health care provider. Use the heat source that your health care provider recommends, such as a moist heat pack or a heating pad.  ? Place a towel between your skin and the heat source.  ? Leave the heat on for 20-30 minutes.  ? Remove the heat if your skin turns bright red. This is especially important if you are unable to feel pain, heat, or cold. You may have a greater risk of getting burned.  · Try soaking in a warm tub.  · Take over-the-counter and prescription medicines only as told by your health care provider.  · Keep all follow-up visits as told by your health care provider. This is important.  Contact a health care provider if:  · You have pain that is not relieved with rest or medicine.  Get help right away if:  · You have weakness or numbness in one or both of your legs or feet.  · You have trouble controlling your bladder or your bowels.  · You have nausea or vomiting.  · You have pain in your abdomen.  · You have shortness of breath or you faint.  This information is not intended to replace advice given to you by your health care provider. Make sure you discuss any questions you have with your health care provider.  Document Released: 01/25/2006 Document Revised: 04/09/2020 Document Reviewed: 06/27/2018  [employer] Patient Education © 2020 [employer]

## 2020-06-23 NOTE — PROGRESS NOTES
Chief Complaint   Patient presents with   • Results   • Med Refill   • Back Pain       Subjective:  Sangita Pradhan is a 49 y.o. female who presents today to follow-up on chronic low back pain to go over recent imaging and lab results.  Patient reports her chronic back pain is fairly stable at this time.  It is greatly unchanged since last visit.  Patient reports she did have acute kidney stone since last visit but she believes she has passed these and she is doing better at this time.  She has been taking oral diclofenac regularly with some improvement of her pain.  She did have her MRI done of her lumbar spine that showed some mild degenerative changes and disc issues but nothing severe.  She denies any worsening of the pain reports as long as she is not working it seems to be somewhat better.  She denies any new neurological signs or symptoms.  She has been tolerating NSAIDs well with no adverse events or side effects.  Denies history of GI ulcers or bleeds.  Denies history of kidney disease.  She did have routine labs done at last visit that were fairly unremarkable.  Overall at this time she is doing fairly well and has no new concerns or questions.    The following portions of the patient's history were reviewed and updated as appropriate: allergies, current medications, past family history, past medical history, past social history, past surgical history and problem list.      Past Medical History:   Diagnosis Date   • Chronic low back pain          Current Outpatient Medications:   •  meloxicam (Mobic) 15 MG tablet, Take 1 tablet by mouth Daily., Disp: 30 tablet, Rfl: 2    Review of Systems    Review of Systems   Constitutional: Negative for activity change, chills, fever and unexpected weight change.   HENT: Negative for hearing loss, rhinorrhea and sore throat.    Eyes: Negative for visual disturbance.   Respiratory: Negative for cough and shortness of breath.    Cardiovascular: Negative for chest pain,  "palpitations and leg swelling.   Gastrointestinal: Negative for abdominal pain, blood in stool, constipation, diarrhea, nausea and vomiting.   Endocrine: Negative for polydipsia, polyphagia and polyuria.   Genitourinary: Negative for dysuria, frequency, hematuria and urgency.   Musculoskeletal: Positive for back pain (chronic). Negative for arthralgias and myalgias.   Skin: Negative for rash.   Neurological: Negative for dizziness, light-headedness, numbness and headaches.   Psychiatric/Behavioral: Negative for sleep disturbance and suicidal ideas.       Objective  Vitals:    06/22/20 0910   BP: 138/70   BP Location: Left arm   Patient Position: Sitting   Pulse: 70   Temp: 97.1 °F (36.2 °C)   TempSrc: Tympanic   SpO2: 98%   Weight: 79.4 kg (175 lb)   Height: 170.2 cm (67\")   PainSc:   4   PainLoc: Back       Physical Exam   Constitutional: She is oriented to person, place, and time. She appears well-developed and well-nourished. No distress.   HENT:   Head: Normocephalic and atraumatic.   Right Ear: External ear normal.   Left Ear: External ear normal.   Eyes: Pupils are equal, round, and reactive to light. Conjunctivae and EOM are normal. Right eye exhibits no discharge. Left eye exhibits no discharge. No scleral icterus.   Neck: Normal range of motion. Neck supple. No tracheal deviation present. No thyromegaly present.   Cardiovascular: Normal rate, regular rhythm and normal heart sounds. Exam reveals no gallop and no friction rub.   No murmur heard.  Pulmonary/Chest: Effort normal and breath sounds normal. No stridor. No respiratory distress. She has no wheezes. She has no rales.   Musculoskeletal: She exhibits no edema.   Lymphadenopathy:     She has no cervical adenopathy.   Neurological: She is alert and oriented to person, place, and time. No cranial nerve deficit. She exhibits normal muscle tone.   Skin: Skin is warm and dry. No rash noted. She is not diaphoretic. No erythema.   Psychiatric: She has a normal " mood and affect. Her behavior is normal. Judgment and thought content normal.   Nursing note and vitals reviewed.      Admission on 06/11/2020, Discharged on 06/11/2020   Component Date Value Ref Range Status   • Glucose 06/11/2020 149* 65 - 99 mg/dL Final   • BUN 06/11/2020 11  6 - 20 mg/dL Final   • Creatinine 06/11/2020 0.62  0.57 - 1.00 mg/dL Final   • Sodium 06/11/2020 139  136 - 145 mmol/L Final   • Potassium 06/11/2020 3.3* 3.5 - 5.2 mmol/L Final   • Chloride 06/11/2020 104  98 - 107 mmol/L Final   • CO2 06/11/2020 21.0* 22.0 - 29.0 mmol/L Final   • Calcium 06/11/2020 9.0  8.6 - 10.5 mg/dL Final   • Total Protein 06/11/2020 6.8  6.0 - 8.5 g/dL Final   • Albumin 06/11/2020 3.90  3.50 - 5.20 g/dL Final   • ALT (SGPT) 06/11/2020 12  1 - 33 U/L Final   • AST (SGOT) 06/11/2020 14  1 - 32 U/L Final   • Alkaline Phosphatase 06/11/2020 54  39 - 117 U/L Final   • Total Bilirubin 06/11/2020 0.3  0.2 - 1.2 mg/dL Final   • eGFR Non African Amer 06/11/2020 102  >60 mL/min/1.73 Final   • Globulin 06/11/2020 2.9  gm/dL Final   • A/G Ratio 06/11/2020 1.3  g/dL Final   • BUN/Creatinine Ratio 06/11/2020 17.7  7.0 - 25.0 Final   • Anion Gap 06/11/2020 14.0  5.0 - 15.0 mmol/L Final   • Color, UA 06/11/2020 Yellow  Yellow, Straw, Dark Yellow, Sherry Final   • Appearance, UA 06/11/2020 Clear  Clear Final   • pH, UA 06/11/2020 6.0  5.0 - 9.0 Final   • Specific Gravity, UA 06/11/2020 1.072* 1.003 - 1.030 Final    Result obtained by Refractometer   • Glucose, UA 06/11/2020 100 mg/dL (Trace)* Negative Final   • Ketones, UA 06/11/2020 40 mg/dL (2+)* Negative Final   • Bilirubin, UA 06/11/2020 Negative  Negative Final   • Blood, UA 06/11/2020 Negative  Negative Final   • Protein, UA 06/11/2020 Trace* Negative Final   • Leuk Esterase, UA 06/11/2020 Negative  Negative Final   • Nitrite, UA 06/11/2020 Negative  Negative Final   • Urobilinogen, UA 06/11/2020 1.0 E.U./dL  0.2 - 1.0 E.U./dL Final   • HCG Qualitative 06/11/2020 Negative   Negative Final   • WBC 06/11/2020 21.01* 3.40 - 10.80 10*3/mm3 Final   • RBC 06/11/2020 4.29  3.77 - 5.28 10*6/mm3 Final   • Hemoglobin 06/11/2020 13.7  12.0 - 15.9 g/dL Final   • Hematocrit 06/11/2020 39.4  34.0 - 46.6 % Final   • MCV 06/11/2020 91.8  79.0 - 97.0 fL Final   • MCH 06/11/2020 31.9  26.6 - 33.0 pg Final   • MCHC 06/11/2020 34.8  31.5 - 35.7 g/dL Final   • RDW 06/11/2020 14.6  12.3 - 15.4 % Final   • RDW-SD 06/11/2020 48.9  37.0 - 54.0 fl Final   • MPV 06/11/2020 8.8  6.0 - 12.0 fL Final   • Platelets 06/11/2020 389  140 - 450 10*3/mm3 Final   • Neutrophil % 06/11/2020 86.3* 42.7 - 76.0 % Final   • Lymphocyte % 06/11/2020 7.8* 19.6 - 45.3 % Final   • Monocyte % 06/11/2020 5.0  5.0 - 12.0 % Final   • Eosinophil % 06/11/2020 0.0* 0.3 - 6.2 % Final   • Basophil % 06/11/2020 0.2  0.0 - 1.5 % Final   • Immature Grans % 06/11/2020 0.7* 0.0 - 0.5 % Final   • Neutrophils, Absolute 06/11/2020 18.12* 1.70 - 7.00 10*3/mm3 Final   • Lymphocytes, Absolute 06/11/2020 1.64  0.70 - 3.10 10*3/mm3 Final   • Monocytes, Absolute 06/11/2020 1.04* 0.10 - 0.90 10*3/mm3 Final   • Eosinophils, Absolute 06/11/2020 0.01  0.00 - 0.40 10*3/mm3 Final   • Basophils, Absolute 06/11/2020 0.05  0.00 - 0.20 10*3/mm3 Final   • Immature Grans, Absolute 06/11/2020 0.15* 0.00 - 0.05 10*3/mm3 Final   • nRBC 06/11/2020 0.0  0.0 - 0.2 /100 WBC Final   • Lipase 06/11/2020 14  13 - 60 U/L Final   Admission on 06/11/2020, Discharged on 06/11/2020   Component Date Value Ref Range Status   • Color 06/11/2020 Yellow  Yellow, Straw, Dark Yellow, Sherry Final   • Clarity, UA 06/11/2020 Cloudy* Clear Final   • Glucose, UA 06/11/2020 Negative  Negative, 1000 mg/dL (3+) mg/dL Final   • Bilirubin 06/11/2020 Negative  Negative Final   • Ketones, UA 06/11/2020 1+* Negative Final   • Specific Gravity  06/11/2020 1.030  1.005 - 1.030 Final   • Blood, UA 06/11/2020 2+* Negative Final   • pH, Urine 06/11/2020 5.0  5.0 - 8.0 Final   • Protein, POC 06/11/2020 1+*  Negative mg/dL Final   • Urobilinogen, UA 06/11/2020 Normal  Normal Final   • Nitrite, UA 06/11/2020 Negative  Negative Final   • Leukocytes 06/11/2020 Small (1+)* Negative Final   Lab on 06/03/2020   Component Date Value Ref Range Status   • Glucose 06/03/2020 104* 65 - 99 mg/dL Final   • BUN 06/03/2020 10  6 - 20 mg/dL Final   • Creatinine 06/03/2020 0.70  0.57 - 1.00 mg/dL Final   • Sodium 06/03/2020 139  136 - 145 mmol/L Final   • Potassium 06/03/2020 5.2  3.5 - 5.2 mmol/L Final   • Chloride 06/03/2020 107  98 - 107 mmol/L Final   • CO2 06/03/2020 21.3* 22.0 - 29.0 mmol/L Final   • Calcium 06/03/2020 9.0  8.6 - 10.5 mg/dL Final   • Total Protein 06/03/2020 6.8  6.0 - 8.5 g/dL Final   • Albumin 06/03/2020 4.00  3.50 - 5.20 g/dL Final   • ALT (SGPT) 06/03/2020 10  1 - 33 U/L Final   • AST (SGOT) 06/03/2020 14  1 - 32 U/L Final   • Alkaline Phosphatase 06/03/2020 55  39 - 117 U/L Final   • Total Bilirubin 06/03/2020 <0.2* 0.2 - 1.2 mg/dL Final   • eGFR Non African Amer 06/03/2020 89  >60 mL/min/1.73 Final   • Globulin 06/03/2020 2.8  gm/dL Final   • A/G Ratio 06/03/2020 1.4  g/dL Final   • BUN/Creatinine Ratio 06/03/2020 14.3  7.0 - 25.0 Final   • Anion Gap 06/03/2020 10.7  5.0 - 15.0 mmol/L Final   • TSH 06/03/2020 0.743  0.270 - 4.200 uIU/mL Final   • Total Cholesterol 06/03/2020 156  0 - 200 mg/dL Final   • Triglycerides 06/03/2020 89  0 - 150 mg/dL Final   • HDL Cholesterol 06/03/2020 56  40 - 60 mg/dL Final   • LDL Cholesterol  06/03/2020 82  0 - 100 mg/dL Final   • VLDL Cholesterol 06/03/2020 17.8  5 - 40 mg/dL Final   • LDL/HDL Ratio 06/03/2020 1.47   Final   • Hepatitis C Ab 06/03/2020 Non-Reactive  Non-Reactive Final   • WBC 06/03/2020 12.43* 3.40 - 10.80 10*3/mm3 Final   • RBC 06/03/2020 4.44  3.77 - 5.28 10*6/mm3 Final   • Hemoglobin 06/03/2020 14.1  12.0 - 15.9 g/dL Final   • Hematocrit 06/03/2020 41.4  34.0 - 46.6 % Final   • MCV 06/03/2020 93.2  79.0 - 97.0 fL Final   • MCH 06/03/2020 31.8  26.6 -  33.0 pg Final   • MCHC 06/03/2020 34.1  31.5 - 35.7 g/dL Final   • RDW 06/03/2020 13.8  12.3 - 15.4 % Final   • RDW-SD 06/03/2020 47.7  37.0 - 54.0 fl Final   • MPV 06/03/2020 9.7  6.0 - 12.0 fL Final   • Platelets 06/03/2020 392  140 - 450 10*3/mm3 Final   • Neutrophil % 06/03/2020 63.1  42.7 - 76.0 % Final   • Lymphocyte % 06/03/2020 28.2  19.6 - 45.3 % Final   • Monocyte % 06/03/2020 6.6  5.0 - 12.0 % Final   • Eosinophil % 06/03/2020 1.2  0.3 - 6.2 % Final   • Basophil % 06/03/2020 0.6  0.0 - 1.5 % Final   • Immature Grans % 06/03/2020 0.3  0.0 - 0.5 % Final   • Neutrophils, Absolute 06/03/2020 7.83* 1.70 - 7.00 10*3/mm3 Final   • Lymphocytes, Absolute 06/03/2020 3.51* 0.70 - 3.10 10*3/mm3 Final   • Monocytes, Absolute 06/03/2020 0.82  0.10 - 0.90 10*3/mm3 Final   • Eosinophils, Absolute 06/03/2020 0.15  0.00 - 0.40 10*3/mm3 Final   • Basophils, Absolute 06/03/2020 0.08  0.00 - 0.20 10*3/mm3 Final   • Immature Grans, Absolute 06/03/2020 0.04  0.00 - 0.05 10*3/mm3 Final   • nRBC 06/03/2020 0.0  0.0 - 0.2 /100 WBC Final   ]    Sangita was seen today for results, med refill and back pain.    Diagnoses and all orders for this visit:    Chronic midline low back pain with right-sided sciatica  Due to ongoing back pain will make referral to pain management at this time.  Will switch to Mobic once daily for pain inflammation.  Continue to use heat and stretching as needed.  Will follow.  -     Ambulatory Referral to Pain Management    Other orders  -     meloxicam (Mobic) 15 MG tablet; Take 1 tablet by mouth Daily.      I spent 15 minutes in direct face to face contact with patient.  Greater than 50% of this time was spent counseling patient and discussing plan of care.    PHQ-2/PHQ-9 Depression Screening 6/1/2020   Little interest or pleasure in doing things 1   Feeling down, depressed, or hopeless 0   Total Score 1           This document has been electronically signed by Da Epperson DO on June 23, 2020 10:25

## 2020-07-21 ENCOUNTER — PROCEDURE VISIT (OUTPATIENT)
Dept: OBSTETRICS AND GYNECOLOGY | Facility: CLINIC | Age: 50
End: 2020-07-21

## 2020-07-21 VITALS
WEIGHT: 175.2 LBS | DIASTOLIC BLOOD PRESSURE: 70 MMHG | BODY MASS INDEX: 27.5 KG/M2 | SYSTOLIC BLOOD PRESSURE: 126 MMHG | HEIGHT: 67 IN

## 2020-07-21 DIAGNOSIS — Z01.419 WOMEN'S ANNUAL ROUTINE GYNECOLOGICAL EXAMINATION: Primary | ICD-10-CM

## 2020-07-21 DIAGNOSIS — Z12.4 SCREENING FOR CERVICAL CANCER: ICD-10-CM

## 2020-07-21 DIAGNOSIS — Z12.39 SCREENING FOR MALIGNANT NEOPLASM OF BREAST: ICD-10-CM

## 2020-07-21 PROCEDURE — 99386 PREV VISIT NEW AGE 40-64: CPT | Performed by: OBSTETRICS & GYNECOLOGY

## 2020-07-21 NOTE — PROGRESS NOTES
"[facility]  Gynecology Consult  Referring provider: Da Epperson DO    CC: Annual well woman exam    HPI  Sangita Pradhan is a 49 y.o.  premenopausal female who presents for her annual well woman exam.  The patient has no complaints.  Denies any vaginal itching, burning, irritation, or discharge.  Denies any abnormal uterine bleeding.  Not currently sexually active as she is .  Denies any sexual dysfunction concerns.  Denies any urinary symptoms including dysuria, frequency, urgency, nocturia.  She does have some stress incontinence but not incredibly uncomfortable.    She exercises regularly: yes.  She wears her seat belt:yes.  She has concerns about domestic violence: no.  She has noticed changes in height: no.    She reports that occasionally she notices that she has an odor.  Does not correlated with anything.  She also reports that during her period, she will get a \"knot down there\" but it will resolve at the end of her period.  Reports that it is tender but not incredibly painful.    ROS  Review of Systems   Constitutional: Negative.    HENT: Negative.    Eyes: Negative.    Respiratory: Negative.    Cardiovascular: Negative.    Gastrointestinal: Negative.    Endocrine: Negative.    Genitourinary: Positive for dyspareunia.        Leakage of urine w/ lifting, cough, sneeze  Occasional vaginal odor   Musculoskeletal: Positive for back pain.   Skin: Negative.    Allergic/Immunologic: Negative.    Neurological: Negative.    Hematological: Negative.    Psychiatric/Behavioral: Negative.      HEALTH MAINTENANCE  Mammogram: \"A long time ago\"  Colonoscopy: N/A  DEXA scan: N/A  Pap smear:   Last Completed Pap Smear       Status Date      PAP SMEAR Done 2008 CONVERTED (HISTORICAL) GYN CYTOLOGY        GYN HISTORY  Menarche: age 14  Menses: Regular, every 28 days, lasts 4-5 days, normal flow, no intermenstrual bleeding  History of STIs: None per patient  Last pap smear:   Last " Completed Pap Smear       Status Date      PAP SMEAR Done 2008 CONVERTED (HISTORICAL) GYN CYTOLOGY      Abnormal pap smear history: None per patient  Contraception: Essure BTL    OB HISTORY  OB History    Para Term  AB Living   3 3 3     3   SAB TAB Ectopic Molar Multiple Live Births             3      # Outcome Date GA Lbr Nik/2nd Weight Sex Delivery Anes PTL Lv   3 Term    2551 g (5 lb 10 oz)  CS-Unspec   [facility]   2 Term    2495 g (5 lb 8 oz)  CS-Unspec   [facility]   1 Term    2920 g (6 lb 7 oz)  Vag-Spont   [facility]     PAST MEDICAL HISTORY  Past Medical History:   Diagnosis Date   • Chronic low back pain      PAST SURGICAL HISTORY  Past Surgical History:   Procedure Laterality Date   • ADENOIDECTOMY     •  SECTION     •  SECTION     • CYST REMOVAL     • ESSURE TUBAL LIGATION Bilateral    • TONSILLECTOMY     • WRIST SURGERY Left      FAMILY HISTORY  Family History   Problem Relation Age of Onset   • Diabetes Mother    • Hyperlipidemia Mother    • Stroke Mother    • Thyroid disease Mother    • Arthritis Father    • Heart disease Father    • No Known Problems Sister    • No Known Problems Brother    • No Known Problems Daughter    • No Known Problems Son    • Breast cancer Maternal Aunt    • Coronary artery disease Paternal Uncle    • No Known Problems Sister    • No Known Problems Son    • Colon cancer Neg Hx    • Uterine cancer Neg Hx    • Ovarian cancer Neg Hx      SOCIAL HISTORY  Social History     Socioeconomic History   • Marital status: Single     Spouse name: Not on file   • Number of children: Not on file   • Years of education: Not on file   • Highest education level: Not on file   Tobacco Use   • Smoking status: Current Every Day Smoker     Packs/day: 1.00     Years: 38.00     Pack years: 38.00   • Smokeless tobacco: Never Used   Substance and Sexual Activity   • Alcohol use: Yes     Alcohol/week: 18.0 standard drinks     Types: 18 Cans of beer per week     Comment:  "Drinks around 18 beer over the course of weekend    • Drug use: Never   • Sexual activity: Yes     Partners: Male     Birth control/protection: Surgical, Condom   Social History Narrative    Single. Currently not working, but recently worked for [employer]. Fired due to back pain and missing 3 days for this. Lives with daughter Kajal here in [place], [place].  Finished high school.      HOME MEDICATIONS  Prior to Admission medications    Medication Sig Start Date End Date Taking? Authorizing Provider   meloxicam (Mobic) 15 MG tablet Take 1 tablet by mouth Daily. 6/22/20  Yes Da Epperson, DO     ALLERGIES  Allergies   Allergen Reactions   • Codeine Hives     PE  /70 (BP Location: Left arm, Patient Position: Sitting, Cuff Size: Adult)   Ht 170.2 cm (67\")   Wt 79.5 kg (175 lb 3.2 oz)   LMP 07/01/2020 (Exact Date)   BMI 27.44 kg/m²        General: Alert, healthy, no distress, well nourished and well developed   Neurologic: Alert, oriented to person, place, and time.  Gait normal.  Cranial nerves II-XII grossly intact.  HEENT: Normocephalic, atraumatic.  Extraocular muscles intact, pupils equal and reactive times two.    Neck: Supple, no adenopathy, thyroid normal size, non-tender, without nodularity, trachea midline   Breasts: Patient declined  Lungs: Normal respiratory effort.  Clear to auscultation bilaterally.   Heart: Regular rate and rhythm, without murmer, rub or gallop.   Abdomen: Soft, non-tender, non-distended,no masses, no hepatosplenomegaly, no hernia.    Skin: No rash, no lesions.  Extremities: No cyanosis, clubbing or edema  PELVIC EXAM:  External Genitalia/Vulva: Anatomy is normal, no significant redness of labia, no discharge on vulvar tissues, [place]'s and Bartholin's glands are normal, no ulcers, no condylomatous lesions    Urethra: Normal, no lesions   Vagina: Vaginal tissues are not inflamed, normal color and texture, no significant discharge present  Cervix: Normal in appearance " "without lesions or purulent discharge, no cervical motion tenderness    Uterus: Normal size, shape, and consistency.  Adnexa: Normal size and shape bilaterally, no palpable mass bilaterally and non-tender bilaterally    Rectal Exam: Normal, no masses or polyps, confirms bimanual exam, perianal normal, no lesions; JOAQUIM deferred.    IMPRESSION  Sangita Pradhan is a 49 y.o.  presenting for annual gynecological exam.    PLAN    1. Women's annual routine gynecological examination  - RTC 1 year for annual exam or PRN  - Encouraged that if she notices vaginal odor or if the \"knot\" is persistent, then she should let us know    2. Screening for cervical cancer  - Liquid-based Pap Smear, Screening    3. Screening for malignant neoplasm of breast  - Mammo screening digital tomosynthesis bilateral w CAD; Future    Thank you for allowing me to participate in the care of this patient.  Please contact me with any questions or concerns.    This document has been electronically signed by Rayna Silveira MD on 2020 10:40.    CC: Da Epperson, DO  "

## 2020-07-27 LAB
LAB AP CASE REPORT: NORMAL
PATH INTERP SPEC-IMP: NORMAL

## 2021-06-30 ENCOUNTER — OFFICE VISIT (OUTPATIENT)
Dept: FAMILY MEDICINE CLINIC | Facility: CLINIC | Age: 51
End: 2021-06-30

## 2021-06-30 VITALS
BODY MASS INDEX: 29.38 KG/M2 | OXYGEN SATURATION: 97 % | WEIGHT: 187.2 LBS | DIASTOLIC BLOOD PRESSURE: 80 MMHG | HEIGHT: 67 IN | SYSTOLIC BLOOD PRESSURE: 124 MMHG | HEART RATE: 69 BPM

## 2021-06-30 DIAGNOSIS — Z12.11 SCREENING FOR COLON CANCER: ICD-10-CM

## 2021-06-30 DIAGNOSIS — R10.9 RIGHT FLANK PAIN: ICD-10-CM

## 2021-06-30 DIAGNOSIS — F17.210 CIGARETTE NICOTINE DEPENDENCE WITHOUT COMPLICATION: ICD-10-CM

## 2021-06-30 DIAGNOSIS — E66.3 OVERWEIGHT (BMI 25.0-29.9): ICD-10-CM

## 2021-06-30 DIAGNOSIS — Z13.220 SCREENING FOR HYPERLIPIDEMIA: ICD-10-CM

## 2021-06-30 DIAGNOSIS — M54.6 THORACIC SPINE PAIN: ICD-10-CM

## 2021-06-30 DIAGNOSIS — M54.41 CHRONIC MIDLINE LOW BACK PAIN WITH RIGHT-SIDED SCIATICA: Primary | ICD-10-CM

## 2021-06-30 DIAGNOSIS — Z12.2 ENCOUNTER FOR SCREENING FOR LUNG CANCER: ICD-10-CM

## 2021-06-30 DIAGNOSIS — R53.82 CHRONIC FATIGUE: ICD-10-CM

## 2021-06-30 DIAGNOSIS — G89.29 CHRONIC MIDLINE LOW BACK PAIN WITH RIGHT-SIDED SCIATICA: Primary | ICD-10-CM

## 2021-06-30 DIAGNOSIS — F41.8 DEPRESSION WITH ANXIETY: ICD-10-CM

## 2021-06-30 DIAGNOSIS — Z12.31 ENCOUNTER FOR SCREENING MAMMOGRAM FOR MALIGNANT NEOPLASM OF BREAST: ICD-10-CM

## 2021-06-30 PROCEDURE — 99214 OFFICE O/P EST MOD 30 MIN: CPT | Performed by: FAMILY MEDICINE

## 2021-06-30 RX ORDER — ESCITALOPRAM OXALATE 10 MG/1
10 TABLET ORAL EVERY MORNING
Qty: 30 TABLET | Refills: 1 | Status: SHIPPED | OUTPATIENT
Start: 2021-06-30 | End: 2021-09-28

## 2021-06-30 RX ORDER — MELOXICAM 15 MG/1
15 TABLET ORAL DAILY
Qty: 30 TABLET | Refills: 2 | Status: SHIPPED | OUTPATIENT
Start: 2021-06-30

## 2021-09-28 ENCOUNTER — OFFICE VISIT (OUTPATIENT)
Dept: FAMILY MEDICINE CLINIC | Facility: CLINIC | Age: 51
End: 2021-09-28

## 2021-09-28 VITALS
HEIGHT: 67 IN | DIASTOLIC BLOOD PRESSURE: 64 MMHG | OXYGEN SATURATION: 98 % | HEART RATE: 71 BPM | SYSTOLIC BLOOD PRESSURE: 110 MMHG | BODY MASS INDEX: 29.91 KG/M2 | WEIGHT: 190.6 LBS

## 2021-09-28 DIAGNOSIS — F41.9 ANXIETY: ICD-10-CM

## 2021-09-28 DIAGNOSIS — H66.90 ACUTE OTITIS MEDIA, UNSPECIFIED OTITIS MEDIA TYPE: ICD-10-CM

## 2021-09-28 DIAGNOSIS — M54.31 SCIATIC NERVE PAIN, RIGHT: ICD-10-CM

## 2021-09-28 DIAGNOSIS — M54.41 CHRONIC RIGHT-SIDED LOW BACK PAIN WITH RIGHT-SIDED SCIATICA: ICD-10-CM

## 2021-09-28 DIAGNOSIS — Z76.89 ENCOUNTER TO ESTABLISH CARE: Primary | ICD-10-CM

## 2021-09-28 DIAGNOSIS — G89.29 CHRONIC RIGHT-SIDED LOW BACK PAIN WITH RIGHT-SIDED SCIATICA: ICD-10-CM

## 2021-09-28 PROCEDURE — 99214 OFFICE O/P EST MOD 30 MIN: CPT | Performed by: NURSE PRACTITIONER

## 2021-09-28 RX ORDER — CYCLOBENZAPRINE HYDROCHLORIDE 7.5 MG/1
7.5 TABLET, FILM COATED ORAL 3 TIMES DAILY PRN
Qty: 30 TABLET | Refills: 3 | Status: SHIPPED | OUTPATIENT
Start: 2021-09-28 | End: 2021-12-01

## 2021-09-28 RX ORDER — AMOXICILLIN 875 MG/1
875 TABLET, COATED ORAL 2 TIMES DAILY
Qty: 10 TABLET | Refills: 0 | Status: SHIPPED | OUTPATIENT
Start: 2021-09-28 | End: 2021-12-01

## 2021-09-28 RX ORDER — FLUTICASONE PROPIONATE 50 MCG
2 SPRAY, SUSPENSION (ML) NASAL DAILY
Qty: 11.1 ML | Refills: 3 | Status: SHIPPED | OUTPATIENT
Start: 2021-09-28 | End: 2022-10-21

## 2021-09-28 RX ORDER — HYDROXYZINE PAMOATE 50 MG/1
50 CAPSULE ORAL 3 TIMES DAILY PRN
Qty: 90 CAPSULE | Refills: 3 | Status: SHIPPED | OUTPATIENT
Start: 2021-09-28 | End: 2022-10-21 | Stop reason: SDUPTHER

## 2021-12-01 ENCOUNTER — OFFICE VISIT (OUTPATIENT)
Dept: FAMILY MEDICINE CLINIC | Facility: CLINIC | Age: 51
End: 2021-12-01

## 2021-12-01 VITALS
BODY MASS INDEX: 30.09 KG/M2 | HEIGHT: 67 IN | SYSTOLIC BLOOD PRESSURE: 118 MMHG | OXYGEN SATURATION: 98 % | HEART RATE: 88 BPM | DIASTOLIC BLOOD PRESSURE: 64 MMHG | RESPIRATION RATE: 22 BRPM | WEIGHT: 191.7 LBS

## 2021-12-01 DIAGNOSIS — M79.602 LEFT ARM PAIN: ICD-10-CM

## 2021-12-01 DIAGNOSIS — R20.2 NUMBNESS AND TINGLING IN LEFT ARM: ICD-10-CM

## 2021-12-01 DIAGNOSIS — H92.01 CHRONIC EAR PAIN, RIGHT: ICD-10-CM

## 2021-12-01 DIAGNOSIS — M54.41 CHRONIC BILATERAL LOW BACK PAIN WITH BILATERAL SCIATICA: ICD-10-CM

## 2021-12-01 DIAGNOSIS — G89.29 CHRONIC EAR PAIN, RIGHT: ICD-10-CM

## 2021-12-01 DIAGNOSIS — H92.01 DISCOMFORT OF RIGHT EAR: ICD-10-CM

## 2021-12-01 DIAGNOSIS — R20.0 NUMBNESS AND TINGLING IN LEFT ARM: ICD-10-CM

## 2021-12-01 DIAGNOSIS — M54.42 CHRONIC BILATERAL LOW BACK PAIN WITH BILATERAL SCIATICA: ICD-10-CM

## 2021-12-01 DIAGNOSIS — G89.29 CHRONIC BILATERAL LOW BACK PAIN WITH BILATERAL SCIATICA: ICD-10-CM

## 2021-12-01 DIAGNOSIS — H91.91 HEARING LOSS OF RIGHT EAR, UNSPECIFIED HEARING LOSS TYPE: Primary | ICD-10-CM

## 2021-12-01 LAB
QT INTERVAL: 412 MS
QTC INTERVAL: 438 MS

## 2021-12-01 PROCEDURE — 99214 OFFICE O/P EST MOD 30 MIN: CPT | Performed by: NURSE PRACTITIONER

## 2021-12-01 PROCEDURE — 93010 ELECTROCARDIOGRAM REPORT: CPT | Performed by: INTERNAL MEDICINE

## 2021-12-01 PROCEDURE — 93005 ELECTROCARDIOGRAM TRACING: CPT | Performed by: NURSE PRACTITIONER

## 2021-12-01 RX ORDER — METHYLPREDNISOLONE 4 MG/1
TABLET ORAL
Qty: 21 EACH | Refills: 0 | Status: SHIPPED | OUTPATIENT
Start: 2021-12-01 | End: 2022-10-21

## 2021-12-01 RX ORDER — CYCLOBENZAPRINE HCL 10 MG
10 TABLET ORAL 3 TIMES DAILY PRN
Qty: 90 TABLET | Refills: 11 | Status: SHIPPED | OUTPATIENT
Start: 2021-12-01 | End: 2022-10-21 | Stop reason: SDUPTHER

## 2021-12-01 NOTE — PROGRESS NOTES
"Chief Complaint  Otitis Media (right ear)    Subjective          Sangita Pradhan presents to [facility] [place] with issues regarding her right ear. She is having trouble hearing out of it and feels like there is something \"clogging\" it. She has also been having numbness & tingling down her left arm, she thought it was how she was sleeping on it, however it is not improving. She is still experiencing back pain, would like to see a specialist   Back Pain  This is a chronic problem. The current episode started more than 1 year ago. The problem occurs daily. The problem has been waxing and waning since onset. The pain is present in the lumbar spine. The quality of the pain is described as shooting and aching. The pain radiates to the right thigh. The pain is mild. The symptoms are aggravated by bending, sitting and standing. Associated symptoms include leg pain, numbness and tingling. Pertinent negatives include no chest pain, dysuria or weakness. She has tried NSAIDs, heat and bed rest for the symptoms. The treatment provided no relief.   Earache   There is pain in the right ear. This is a new problem. The current episode started 1 to 4 weeks ago. The problem occurs constantly. There has been no fever. The pain is mild. Associated symptoms include coughing. Pertinent negatives include no diarrhea or sore throat. Treatments tried: Peroxide  The treatment provided no relief.   Arm Pain   The incident occurred more than 1 week ago. The incident occurred at home. There was no injury mechanism. The pain is present in the left shoulder, left hand, left forearm, left fingers and left elbow. Associated symptoms include numbness and tingling. Pertinent negatives include no chest pain. She has tried NSAIDs for the symptoms. The treatment provided no relief.     Outpatient Medications Prior to Visit   Medication Sig Dispense Refill   • diclofenac (VOLTAREN) 50 MG EC tablet Take 1 " "tablet by mouth 2 (Two) Times a Day As Needed (For sciatic pain). 60 tablet 3   • fluticasone (Flonase) 50 MCG/ACT nasal spray 2 sprays into the nostril(s) as directed by provider Daily. 11.1 mL 3   • hydrOXYzine pamoate (Vistaril) 50 MG capsule Take 1 capsule by mouth 3 (Three) Times a Day As Needed for Anxiety. 90 capsule 3   • meloxicam (MOBIC) 15 MG tablet Take 1 tablet by mouth Daily. 30 tablet 2   • cyclobenzaprine (FEXMID) 7.5 MG tablet Take 1 tablet by mouth 3 (Three) Times a Day As Needed for Muscle Spasms. 30 tablet 3   • amoxicillin (AMOXIL) 875 MG tablet Take 1 tablet by mouth 2 (Two) Times a Day. 10 tablet 0     No facility-administered medications prior to visit.       Review of Systems   Constitutional: Negative for activity change, appetite change and chills.   HENT: Negative for congestion, ear pain, sore throat and trouble swallowing.    Eyes: Negative for discharge, itching and visual disturbance.   Respiratory: Positive for cough. Negative for apnea.    Cardiovascular: Negative for chest pain and leg swelling.   Gastrointestinal: Negative for abdominal distention, constipation, diarrhea and nausea.   Endocrine: Negative for cold intolerance, heat intolerance and polyuria.   Genitourinary: Negative for dysuria, frequency and urgency.   Musculoskeletal: Negative for arthralgias, back pain and myalgias.   Skin: Negative for color change, pallor and wound.   Neurological: Positive for tingling and numbness. Negative for dizziness, seizures, syncope, weakness and light-headedness.   Psychiatric/Behavioral: Negative for agitation, confusion and sleep disturbance. The patient is not nervous/anxious.          Objective   Vital Signs:   Visit Vitals  /64 (BP Location: Left arm, Patient Position: Sitting, Cuff Size: Large Adult)   Pulse 88   Resp 22   Ht 170.2 cm (67\")   Wt 87 kg (191 lb 11.2 oz)   SpO2 98%   BMI 30.02 kg/m²     Physical Exam  Vitals and nursing note reviewed.   Constitutional:       " Appearance: She is well-developed.   HENT:      Head: Normocephalic and atraumatic.      Right Ear: Decreased hearing noted. Tympanic membrane is scarred.   Eyes:      General: Lids are normal.      Conjunctiva/sclera: Conjunctivae normal.   Neck:      Thyroid: No thyroid mass or thyromegaly.      Trachea: Trachea normal. No tracheal tenderness.   Cardiovascular:      Rate and Rhythm: Normal rate.      Pulses: Normal pulses.      Heart sounds: Normal heart sounds.   Pulmonary:      Effort: Pulmonary effort is normal. No respiratory distress.      Breath sounds: Examination of the left-upper field reveals wheezing. Wheezing present.   Abdominal:      General: There is no distension.      Palpations: Abdomen is soft. There is no mass.   Musculoskeletal:         General: Normal range of motion.      Cervical back: Normal range of motion. No edema.   Lymphadenopathy:      Head:      Right side of head: No submental, submandibular or tonsillar adenopathy.      Left side of head: No submental, submandibular or tonsillar adenopathy.      Cervical: Cervical adenopathy present.      Right cervical: Superficial cervical adenopathy present.   Skin:     General: Skin is warm and dry.      Coloration: Skin is not pale.      Findings: No abrasion, erythema or lesion.   Neurological:      Mental Status: She is alert and oriented to person, place, and time.   Psychiatric:         Mood and Affect: Mood is not anxious. Affect is not inappropriate.         Speech: Speech normal.         Behavior: Behavior normal.         Thought Content: Thought content normal.         Judgment: Judgment normal. Judgment is not impulsive.        Result Review :                 Assessment and Plan    Diagnoses and all orders for this visit:    1. Hearing loss of right ear, unspecified hearing loss type (Primary)  -     Ambulatory Referral to ENT (Otolaryngology)    2. Discomfort of right ear  -     Ambulatory Referral to ENT (Otolaryngology)    3.  Chronic bilateral low back pain with bilateral sciatica  -     cyclobenzaprine (FLEXERIL) 10 MG tablet; Take 1 tablet by mouth 3 (Three) Times a Day As Needed for Muscle Spasms.  Dispense: 90 tablet; Refill: 11  -     methylPREDNISolone (MEDROL) 4 MG dose pack; Take as directed on package instructions.  Dispense: 21 each; Refill: 0  -     Ambulatory Referral to Neurosurgery    4. Left arm pain  -     ECG 12 Lead  -     MRI Cervical Spine Without Contrast; Future    5. Numbness and tingling in left arm  -     Ambulatory Referral to Neurosurgery  -     MRI Cervical Spine Without Contrast; Future    6. Chronic ear pain, right  -     Ambulatory Referral to ENT (Otolaryngology)        Referral to ENT for ongoing ear issue    EKG done in office- NSR    Referral to Neuro regarding chronic back pain, previous MRI showed disc issues, pain is getting worse          I spent 30 minutes caring for Sangita on this date of service. This time includes time spent by me in the following activities:preparing for the visit, reviewing tests, performing a medically appropriate examination and/or evaluation , counseling and educating the patient/family/caregiver, ordering medications, tests, or procedures, referring and communicating with other health care professionals , documenting information in the medical record and independently interpreting results and communicating that information with the patient/family/caregiver  Follow Up   Return in about 4 weeks (around 12/29/2021), or if symptoms worsen or fail to improve, for Recheck.  Patient was given instructions and counseling regarding her condition or for health maintenance advice. Please see specific information pulled into the AVS if appropriate.           This document has been electronically signed by YARI Chaudhari on December 1, 2021 14:27 CST

## 2021-12-27 ENCOUNTER — TELEPHONE (OUTPATIENT)
Dept: GENERAL RADIOLOGY | Facility: HOSPITAL | Age: 51
End: 2021-12-27

## 2021-12-27 NOTE — TELEPHONE ENCOUNTER
Pt was a no show for mri appt on [id number]   Complex Repair And Single Advancement Flap Text: The defect edges were debeveled with a #15 scalpel blade.  The primary defect was closed partially with a complex linear closure.  Given the location of the remaining defect, shape of the defect and the proximity to free margins a single advancement flap was deemed most appropriate for complete closure of the defect.  Using a sterile surgical marker, an appropriate advancement flap was drawn incorporating the defect and placing the expected incisions within the relaxed skin tension lines where possible.    The area thus outlined was incised deep to adipose tissue with a #15 scalpel blade.  The skin margins were undermined to an appropriate distance in all directions utilizing iris scissors.

## 2022-10-11 ENCOUNTER — TELEPHONE (OUTPATIENT)
Dept: FAMILY MEDICINE CLINIC | Facility: CLINIC | Age: 52
End: 2022-10-11

## 2022-10-11 NOTE — TELEPHONE ENCOUNTER
Incoming Refill Request      Medication requested (name and dose): VISTARIL 50MG    Pharmacy where request should be sent: [facility]    Additional details provided by patient: [facility]    Best call back number: [phone]    Does the patient have less than a 3 day supply:  [x] Yes  [] No    Jerson Gifford Rep  10/11/22, 08:38 CDT

## 2022-10-11 NOTE — TELEPHONE ENCOUNTER
Yodit,   She has not seen you in over a year. Looks like the Prednisone was written back in 2021 so she should have been out.   All scripts should be  since they were written a year ago.     Please Advise    ANASTACIA Rowe

## 2022-10-11 NOTE — TELEPHONE ENCOUNTER
Incoming Refill Request      Medication requested (name and dose): MEDROL 4MG    Pharmacy where request should be sent: [facility]    Additional details provided by patient: [facility]    Best call back number: [phone]    Does the patient have less than a 3 day supply:  [x] Yes  [] No    Jerson Gifford Rep  10/11/22, 08:41 CDT

## 2022-10-21 ENCOUNTER — OFFICE VISIT (OUTPATIENT)
Dept: FAMILY MEDICINE CLINIC | Facility: CLINIC | Age: 52
End: 2022-10-21

## 2022-10-21 VITALS
SYSTOLIC BLOOD PRESSURE: 124 MMHG | RESPIRATION RATE: 24 BRPM | DIASTOLIC BLOOD PRESSURE: 66 MMHG | WEIGHT: 193.6 LBS | OXYGEN SATURATION: 97 % | HEART RATE: 93 BPM | HEIGHT: 67 IN | BODY MASS INDEX: 30.39 KG/M2

## 2022-10-21 DIAGNOSIS — M54.31 SCIATIC NERVE PAIN, RIGHT: ICD-10-CM

## 2022-10-21 DIAGNOSIS — R20.0 NUMBNESS AND TINGLING IN LEFT ARM: Primary | ICD-10-CM

## 2022-10-21 DIAGNOSIS — M25.551 RIGHT HIP PAIN: ICD-10-CM

## 2022-10-21 DIAGNOSIS — G89.29 CHRONIC BILATERAL LOW BACK PAIN WITH BILATERAL SCIATICA: ICD-10-CM

## 2022-10-21 DIAGNOSIS — F41.9 ANXIETY: ICD-10-CM

## 2022-10-21 DIAGNOSIS — M54.41 CHRONIC RIGHT-SIDED LOW BACK PAIN WITH RIGHT-SIDED SCIATICA: ICD-10-CM

## 2022-10-21 DIAGNOSIS — M54.42 CHRONIC BILATERAL LOW BACK PAIN WITH BILATERAL SCIATICA: ICD-10-CM

## 2022-10-21 DIAGNOSIS — R20.2 NUMBNESS AND TINGLING IN LEFT ARM: Primary | ICD-10-CM

## 2022-10-21 DIAGNOSIS — M79.604 RIGHT LEG PAIN: ICD-10-CM

## 2022-10-21 DIAGNOSIS — M54.41 CHRONIC BILATERAL LOW BACK PAIN WITH BILATERAL SCIATICA: ICD-10-CM

## 2022-10-21 DIAGNOSIS — L81.9 DISCOLORATION OF SKIN OF FOOT: ICD-10-CM

## 2022-10-21 DIAGNOSIS — G89.29 CHRONIC RIGHT-SIDED LOW BACK PAIN WITH RIGHT-SIDED SCIATICA: ICD-10-CM

## 2022-10-21 PROCEDURE — 99214 OFFICE O/P EST MOD 30 MIN: CPT | Performed by: NURSE PRACTITIONER

## 2022-10-21 RX ORDER — HYDROXYZINE PAMOATE 50 MG/1
50 CAPSULE ORAL 3 TIMES DAILY PRN
Qty: 90 CAPSULE | Refills: 3 | Status: SHIPPED | OUTPATIENT
Start: 2022-10-21

## 2022-10-21 RX ORDER — CYCLOBENZAPRINE HCL 10 MG
10 TABLET ORAL 3 TIMES DAILY PRN
Qty: 90 TABLET | Refills: 11 | Status: SHIPPED | OUTPATIENT
Start: 2022-10-21

## 2022-10-21 RX ORDER — METHYLPREDNISOLONE 4 MG/1
TABLET ORAL
Qty: 21 EACH | Refills: 0 | Status: SHIPPED | OUTPATIENT
Start: 2022-10-21

## 2022-10-21 NOTE — PROGRESS NOTES
Chief Complaint  Hearing loss (Follow up)    Subjective          Sangita Pradhan presents to [facility] [place] to discuss issues she has been having with her right hip and right leg. She states that there are times where her toes turn a different color as well. She is still experiencing her chronic back pain and the numbness and tingling in her arm     Back Pain  This is a chronic problem. The current episode started more than 1 year ago. The problem occurs daily. The problem has been waxing and waning since onset. The pain is present in the lumbar spine. The quality of the pain is described as shooting and aching. The pain radiates to the right thigh. The pain is mild. The symptoms are aggravated by bending, sitting and standing. Associated symptoms include leg pain, numbness and tingling. Pertinent negatives include no chest pain, dysuria or weakness. She has tried NSAIDs, heat and bed rest for the symptoms. The treatment provided no relief.   Arm Pain   The incident occurred more than 1 week ago. The incident occurred at home. There was no injury mechanism. The pain is present in the left shoulder, left hand, left forearm, left fingers and left elbow. Associated symptoms include muscle weakness, numbness and tingling. Pertinent negatives include no chest pain. She has tried NSAIDs for the symptoms. The treatment provided no relief.   Leg Pain   The incident occurred more than 1 week ago. There was no injury mechanism. The pain is at a severity of 8/10. The pain is moderate. The pain has been fluctuating since onset. Associated symptoms include muscle weakness, numbness and tingling. Nothing aggravates the symptoms. She has tried rest, NSAIDs and acetaminophen for the symptoms. The treatment provided no relief.     Outpatient Medications Prior to Visit   Medication Sig Dispense Refill   • meloxicam (MOBIC) 15 MG tablet Take 1 tablet by mouth Daily. 30 tablet 2   •  "cyclobenzaprine (FLEXERIL) 10 MG tablet Take 1 tablet by mouth 3 (Three) Times a Day As Needed for Muscle Spasms. 90 tablet 11   • diclofenac (VOLTAREN) 50 MG EC tablet Take 1 tablet by mouth 2 (Two) Times a Day As Needed (For sciatic pain). 60 tablet 3   • hydrOXYzine pamoate (Vistaril) 50 MG capsule Take 1 capsule by mouth 3 (Three) Times a Day As Needed for Anxiety. 90 capsule 3   • fluticasone (Flonase) 50 MCG/ACT nasal spray 2 sprays into the nostril(s) as directed by provider Daily. 11.1 mL 3   • methylPREDNISolone (MEDROL) 4 MG dose pack Take as directed on package instructions. 21 each 0     No facility-administered medications prior to visit.       Review of Systems   Cardiovascular: Negative for chest pain.   Genitourinary: Negative for dysuria.   Musculoskeletal: Negative for back pain.   Neurological: Positive for tingling and numbness. Negative for weakness.         Objective   Vital Signs:   Visit Vitals  /66 (BP Location: Left arm, Patient Position: Sitting, Cuff Size: Large Adult)   Pulse 93   Resp 24   Ht 170.2 cm (67\")   Wt 87.8 kg (193 lb 9.6 oz)   SpO2 97%   BMI 30.32 kg/m²     Physical Exam  Vitals and nursing note reviewed.   Constitutional:       Appearance: She is well-developed.   HENT:      Head: Normocephalic and atraumatic.   Eyes:      General: Lids are normal.      Conjunctiva/sclera: Conjunctivae normal.   Neck:      Thyroid: No thyroid mass or thyromegaly.      Trachea: Trachea normal. No tracheal tenderness.   Cardiovascular:      Rate and Rhythm: Normal rate.      Pulses: Normal pulses.      Heart sounds: Normal heart sounds.   Pulmonary:      Effort: Pulmonary effort is normal. No respiratory distress.      Breath sounds: Normal breath sounds. No wheezing.   Abdominal:      General: There is no distension.      Palpations: Abdomen is soft. There is no mass.   Musculoskeletal:         General: Normal range of motion.        Arms:       Cervical back: Normal range of motion. No " edema.      Right hip: Tenderness present.      Right upper leg: Tenderness present.      Right lower leg: Tenderness present.        Legs:    Skin:     General: Skin is warm and dry.      Coloration: Skin is not pale.      Findings: No abrasion, erythema or lesion.   Neurological:      Mental Status: She is alert and oriented to person, place, and time.   Psychiatric:         Mood and Affect: Mood is not anxious. Affect is not inappropriate.         Speech: Speech normal.         Behavior: Behavior normal.         Thought Content: Thought content normal.         Judgment: Judgment normal. Judgment is not impulsive.        Result Review :                 Assessment and Plan {CC Problem List  Visit Diagnosis  ROS  Review (Popup)  [facility]  Quality  BestPractice  Medications  SmartSets  SnapShot Encounters  Media :23}   Diagnoses and all orders for this visit:    1. Numbness and tingling in left arm (Primary)  -     Nerve Conduction Test; Future  -     methylPREDNISolone (MEDROL) 4 MG dose pack; Take as directed on package instructions.  Dispense: 21 each; Refill: 0    2. Chronic right-sided low back pain with right-sided sciatica  -     methylPREDNISolone (MEDROL) 4 MG dose pack; Take as directed on package instructions.  Dispense: 21 each; Refill: 0  -     diclofenac (VOLTAREN) 50 MG EC tablet; Take 1 tablet by mouth 2 (Two) Times a Day As Needed (For sciatic pain).  Dispense: 60 tablet; Refill: 3    3. Anxiety  -     hydrOXYzine pamoate (Vistaril) 50 MG capsule; Take 1 capsule by mouth 3 (Three) Times a Day As Needed for Anxiety.  Dispense: 90 capsule; Refill: 3    4. Chronic bilateral low back pain with bilateral sciatica  -     cyclobenzaprine (FLEXERIL) 10 MG tablet; Take 1 tablet by mouth 3 (Three) Times a Day As Needed for Muscle Spasms.  Dispense: 90 tablet; Refill: 11    5. Sciatic nerve pain, right  -     diclofenac (VOLTAREN) 50 MG EC tablet; Take 1 tablet by mouth 2 (Two) Times a Day  As Needed (For sciatic pain).  Dispense: 60 tablet; Refill: 3    6. Right hip pain  -     Cancel: Ambulatory Referral to Cardiovascular Surgery  -     Ambulatory Referral to Cardiovascular Surgery    7. Right leg pain  -     Cancel: Ambulatory Referral to Cardiovascular Surgery  -     Ambulatory Referral to Cardiovascular Surgery    8. Discoloration of skin of foot  -     Cancel: Ambulatory Referral to Cardiovascular Surgery  -     Ambulatory Referral to Cardiovascular Surgery        Medication refills sent to pharmacy    Referral placed to Cardiovascular   They will call to schedule appointment     Nerve conduction test ordered for left arm     Please call the office if you have any issues or worsening of symptoms           Follow Up   Return in 4 weeks (on 11/18/2022), or if symptoms worsen or fail to improve, for Recheck.  Patient was given instructions and counseling regarding her condition or for health maintenance advice. Please see specific information pulled into the AVS if appropriate.           This document has been electronically signed by YARI Chaudhari on October 24, 2022 13:03 CDT

## 2022-10-25 DIAGNOSIS — I73.9 PVD (PERIPHERAL VASCULAR DISEASE) WITH CLAUDICATION: Primary | ICD-10-CM

## 2022-11-18 ENCOUNTER — TELEPHONE (OUTPATIENT)
Dept: FAMILY MEDICINE CLINIC | Facility: CLINIC | Age: 52
End: 2022-11-18

## 2022-11-18 NOTE — TELEPHONE ENCOUNTER
I called the patient to let her know that we aren't allowed to do disability paperwork  She asked me to mail her the paperwork so I have put in in the mail to her

## 2023-08-14 ENCOUNTER — OFFICE VISIT (OUTPATIENT)
Dept: CARDIAC SURGERY | Facility: CLINIC | Age: 53
End: 2023-08-14
Payer: MEDICAID

## 2023-08-14 VITALS
WEIGHT: 201 LBS | SYSTOLIC BLOOD PRESSURE: 126 MMHG | HEART RATE: 82 BPM | OXYGEN SATURATION: 98 % | BODY MASS INDEX: 31.55 KG/M2 | DIASTOLIC BLOOD PRESSURE: 64 MMHG | HEIGHT: 67 IN

## 2023-08-14 DIAGNOSIS — E66.9 CLASS 1 OBESITY WITHOUT SERIOUS COMORBIDITY WITH BODY MASS INDEX (BMI) OF 31.0 TO 31.9 IN ADULT, UNSPECIFIED OBESITY TYPE: ICD-10-CM

## 2023-08-14 DIAGNOSIS — F17.200 SMOKER: ICD-10-CM

## 2023-08-14 DIAGNOSIS — I73.9 PVD (PERIPHERAL VASCULAR DISEASE) WITH CLAUDICATION: Primary | ICD-10-CM

## 2023-08-14 DIAGNOSIS — R60.0 BILATERAL LOWER EXTREMITY EDEMA: ICD-10-CM

## 2023-08-14 PROCEDURE — 1160F RVW MEDS BY RX/DR IN RCRD: CPT | Performed by: NURSE PRACTITIONER

## 2023-08-14 PROCEDURE — 99214 OFFICE O/P EST MOD 30 MIN: CPT | Performed by: NURSE PRACTITIONER

## 2023-08-14 PROCEDURE — 1159F MED LIST DOCD IN RCRD: CPT | Performed by: NURSE PRACTITIONER

## 2023-08-14 NOTE — PATIENT INSTRUCTIONS
No blood clot in legs.  Mild leg swelling likely due to NSAID use    Recommend continued use of compression stockings 20-30mmHg daily, may remove at night.  Advised elevation of legs while at rest.  Encouraged daily exercise. Watch sodium intake     Follow up as needed [phone]

## 2023-08-14 NOTE — PROGRESS NOTES
CVTS Office Progress Note     2023    Sangita Pradhan  1970    Chief Complaint:    Chief Complaint   Patient presents with    Leg Swelling    Leg Pain       HPI:      PCP:  Yodit Shen APRN    52 y.o. female with anxiety, back pain sciatic nerve pain, no significant prior medical history had prior  otherwise no significant prior surgical history.  smokes 0.45 PPD and former smoker.  Referred to vascular surgery for bilateral lower extremity edema occasional leg pain.  Denies lifestyle limiting claudication.  Swelling worse in evening better in morning.  no other associated signs, symptoms or modifying factors.    23 TAYLER: Right 1.04 triphasic.  Left 1.02 triphasic  2023 bilateral venous: Negative for DVT or reflux    The following portions of the patient's history were reviewed and updated as appropriate: allergies, current medications, past family history, past medical history, past social history, past surgical history and problem list.  Recent images independently reviewed.  Available laboratory values reviewed.    PMH:  Past Medical History:   Diagnosis Date    Chronic low back pain      Past Surgical History:   Procedure Laterality Date    ADENOIDECTOMY       SECTION       SECTION      CYST REMOVAL      ESSURE TUBAL LIGATION Bilateral     TONSILLECTOMY      WRIST SURGERY Left      Family History   Problem Relation Age of Onset    Diabetes Mother     Hyperlipidemia Mother     Stroke Mother     Thyroid disease Mother     Hypertension Mother     Arthritis Father     Heart disease Father     Asthma Father     Hypertension Father     Colon cancer Father     No Known Problems Sister     No Known Problems Brother     No Known Problems Daughter     No Known Problems Son     Breast cancer Maternal Aunt     Cancer Maternal Aunt     Coronary artery disease Paternal Uncle     No Known Problems Sister     No Known Problems Son     Uterine cancer Neg Hx     Ovarian cancer Neg  Hx      Social History     Tobacco Use    Smoking status: Every Day     Packs/day: 1.00     Years: 38.00     Pack years: 38.00     Types: Cigarettes     Passive exposure: Current    Smokeless tobacco: Never   Substance Use Topics    Alcohol use: Yes     Alcohol/week: 18.0 standard drinks     Types: 18 Cans of beer per week     Comment: Drinks around 18 beer over the course of weekend     Drug use: Never       ALLERGIES:  Allergies   Allergen Reactions    Codeine Hives         MEDICATIONS:    Current Outpatient Medications:     diclofenac (VOLTAREN) 50 MG EC tablet, Take 1 tablet by mouth 2 (Two) Times a Day As Needed (For sciatic pain)., Disp: 60 tablet, Rfl: 3    hydrOXYzine pamoate (Vistaril) 50 MG capsule, Take 1 capsule by mouth 3 (Three) Times a Day As Needed for Anxiety., Disp: 90 capsule, Rfl: 3    meloxicam (MOBIC) 15 MG tablet, Take 1 tablet by mouth Daily., Disp: 30 tablet, Rfl: 2      Review of Systems   Constitutional: Negative for chills, decreased appetite, fever and weight loss.   HENT:  Negative for congestion, nosebleeds and sore throat.    Eyes:  Negative for blurred vision, visual disturbance and visual halos.   Cardiovascular:  Positive for leg swelling. Negative for chest pain and dyspnea on exertion.   Respiratory:  Negative for cough, shortness of breath, sputum production and wheezing.    Endocrine: Negative for cold intolerance and polyuria.   Hematologic/Lymphatic: Negative for bleeding problem. Does not bruise/bleed easily.   Skin:  Positive for unusual hair distribution. Negative for flushing and nail changes.   Musculoskeletal:  Positive for arthritis, back pain and joint pain.   Gastrointestinal:  Negative for bloating, abdominal pain, hematemesis, melena, nausea and vomiting.   Genitourinary:  Negative for flank pain and hematuria.   Neurological:  Negative for brief paralysis, difficulty with concentration, focal weakness, light-headedness, loss of balance, numbness, paresthesias and  "weakness.   Psychiatric/Behavioral:  Negative for altered mental status, depression, substance abuse and suicidal ideas.    Allergic/Immunologic: Negative for hives and persistent infections.       Vitals:    08/14/23 1445   BP: 126/64   BP Location: Left arm   Pulse: 82   SpO2: 98%   Weight: 91.2 kg (201 lb)   Height: 170.2 cm (67\")     Physical Exam  Constitutional:       Appearance: Normal appearance.   HENT:      Head: Normocephalic.      Nose: Nose normal.      Mouth/Throat:      Mouth: Mucous membranes are moist.   Eyes:      Pupils: Pupils are equal, round, and reactive to light.   Cardiovascular:      Rate and Rhythm: Normal rate.      Pulses: Normal pulses.   Pulmonary:      Effort: Pulmonary effort is normal.   Abdominal:      General: Abdomen is flat.      Palpations: Abdomen is soft.   Musculoskeletal:      Right lower leg: Edema present.      Left lower leg: Edema present.   Skin:     General: Skin is warm and dry.      Capillary Refill: Capillary refill takes 2 to 3 seconds.      Findings: No erythema.   Neurological:      Mental Status: She is alert. Mental status is at baseline.   Psychiatric:         Mood and Affect: Mood normal.       Assessment & Plan     Independent Review of Studies    1. PVD (peripheral vascular disease) with claudication  Normal resting perfusion to bilateral lower extremities.    Arterial insufficiency ruled out as possible cause for leg pain.    Repeat TAYLER on as-needed basis    Symptoms of claudication discussed and reviewed    2. Class 1 obesity without serious comorbidity with body mass index (BMI) of 31.0 to 31.9 in adult, unspecified obesity type  Body mass index is 31.48 kg/mý. Class 1 obesity, We have discussed the benefits of weight loss as it relates to long term morbidity and disease prevention.  Interventions discussed included self-monitoring of caloric intake, increasing physical activity/exercise, goal setting, stimulus control, consultation with dietitian, and " non-food rewards.      3. Bilateral lower extremity edema  Recommend continued use of compression stockings 20-30mmHg daily, may remove at night.  Advised elevation of legs while at rest.  Encouraged daily exercise.     No reflux or DVT seen on today's ultrasound.  Suspect her edema is likely the result of NSAID use resulting in sodium retention.  Instructed patient to monitor sodium intake less than 2 g/day, compression stockings if needed samples were provided.    4. Smoker  Smoking cessation assistance options offered including behavioral counseling (Smoking Cessation Classes), Nicotine replacement therapy (patches or gum), pharmacologic therapy (Chantix, Wellbutrin). Understands tobacco increases risk of expanding AAA, MI, CVA, PAD, carcinoma. Discussion and question answer period 5-7 minutes.      Detailed discussion regarding risks, benefits, and treatment plan. Images independently reviewed. Patient understands, agrees, and wishes to proceed with plan.       This document has been electronically signed by LINDSEY CervantesCNP-BC Ethel  On August 14, 2023 15:55 CDT

## 2023-08-21 ENCOUNTER — OFFICE VISIT (OUTPATIENT)
Dept: FAMILY MEDICINE CLINIC | Facility: CLINIC | Age: 53
End: 2023-08-21
Payer: MEDICAID

## 2023-08-21 VITALS
HEIGHT: 67 IN | HEART RATE: 74 BPM | DIASTOLIC BLOOD PRESSURE: 60 MMHG | OXYGEN SATURATION: 99 % | WEIGHT: 201 LBS | SYSTOLIC BLOOD PRESSURE: 118 MMHG | BODY MASS INDEX: 31.55 KG/M2

## 2023-08-21 DIAGNOSIS — M54.41 CHRONIC BILATERAL LOW BACK PAIN WITH BILATERAL SCIATICA: Primary | ICD-10-CM

## 2023-08-21 DIAGNOSIS — M54.42 CHRONIC BILATERAL LOW BACK PAIN WITH BILATERAL SCIATICA: Primary | ICD-10-CM

## 2023-08-21 DIAGNOSIS — M54.31 SCIATIC NERVE PAIN, RIGHT: ICD-10-CM

## 2023-08-21 DIAGNOSIS — M79.604 RIGHT LEG PAIN: ICD-10-CM

## 2023-08-21 DIAGNOSIS — M54.41 CHRONIC RIGHT-SIDED LOW BACK PAIN WITH RIGHT-SIDED SCIATICA: ICD-10-CM

## 2023-08-21 DIAGNOSIS — R93.7 ABNORMAL MRI, LUMBAR SPINE: ICD-10-CM

## 2023-08-21 DIAGNOSIS — G89.29 CHRONIC BILATERAL LOW BACK PAIN WITH BILATERAL SCIATICA: Primary | ICD-10-CM

## 2023-08-21 DIAGNOSIS — G89.29 CHRONIC RIGHT-SIDED LOW BACK PAIN WITH RIGHT-SIDED SCIATICA: ICD-10-CM

## 2023-08-21 PROCEDURE — 99214 OFFICE O/P EST MOD 30 MIN: CPT | Performed by: NURSE PRACTITIONER

## 2023-08-21 PROCEDURE — 1160F RVW MEDS BY RX/DR IN RCRD: CPT | Performed by: NURSE PRACTITIONER

## 2023-08-21 PROCEDURE — 1159F MED LIST DOCD IN RCRD: CPT | Performed by: NURSE PRACTITIONER

## 2023-08-21 NOTE — PROGRESS NOTES
"Chief Complaint  Back Pain (Back pain for a week)    Subjective          Sangita Pradhan presents to [facility] [place] with low back pain that is getting worse.       Back Pain  This is a recurrent problem. The current episode started more than 1 month ago. The problem occurs constantly. The problem has been gradually worsening since onset. The pain is present in the lumbar spine. The quality of the pain is described as aching. The pain is at a severity of 8/10. Associated symptoms include leg pain. She has tried NSAIDs for the symptoms.   Outpatient Medications Prior to Visit   Medication Sig Dispense Refill    hydrOXYzine pamoate (Vistaril) 50 MG capsule Take 1 capsule by mouth 3 (Three) Times a Day As Needed for Anxiety. 90 capsule 3    diclofenac (VOLTAREN) 50 MG EC tablet Take 1 tablet by mouth 2 (Two) Times a Day As Needed (For sciatic pain). 60 tablet 3    meloxicam (MOBIC) 15 MG tablet Take 1 tablet by mouth Daily. 30 tablet 2     No facility-administered medications prior to visit.       Review of Systems   Musculoskeletal:  Positive for back pain.       Objective   Vital Signs:   Visit Vitals  /60 (BP Location: Left arm, Patient Position: Sitting, Cuff Size: Adult)   Pulse 74   Ht 170.2 cm (67.01\")   Wt 91.2 kg (201 lb)   LMP 08/16/2023 (Exact Date)   SpO2 99%   BMI 31.47 kg/mý     Physical Exam  Vitals and nursing note reviewed.   Constitutional:       Appearance: She is well-developed.   HENT:      Head: Normocephalic and atraumatic.   Eyes:      General: Lids are normal.      Conjunctiva/sclera: Conjunctivae normal.   Neck:      Thyroid: No thyroid mass or thyromegaly.      Trachea: Trachea normal. No tracheal tenderness.   Cardiovascular:      Rate and Rhythm: Normal rate.      Pulses: Normal pulses.      Heart sounds: Normal heart sounds.   Pulmonary:      Effort: Pulmonary effort is normal. No respiratory distress.      Breath sounds: Normal " breath sounds. No wheezing.   Abdominal:      General: There is no distension.      Palpations: Abdomen is soft. There is no mass.   Musculoskeletal:      Cervical back: Normal range of motion. No edema.      Lumbar back: Spasms and tenderness present. Positive right straight leg raise test.        Back:    Skin:     General: Skin is warm and dry.      Coloration: Skin is not pale.      Findings: No abrasion, erythema or lesion.   Neurological:      Mental Status: She is alert and oriented to person, place, and time.   Psychiatric:         Mood and Affect: Mood is not anxious. Affect is not inappropriate.         Speech: Speech normal.         Behavior: Behavior normal.         Thought Content: Thought content normal.         Judgment: Judgment normal. Judgment is not impulsive.      Result Review :       Data reviewed : Radiologic studies MRI           Assessment and Plan    Diagnoses and all orders for this visit:    1. Chronic bilateral low back pain with bilateral sciatica (Primary)    2. Right leg pain    3. Sciatic nerve pain, right  -     diclofenac (VOLTAREN) 50 MG EC tablet; Take 1 tablet by mouth 2 (Two) Times a Day As Needed (For sciatic pain).  Dispense: 60 tablet; Refill: 3  -     Ambulatory Referral to Neurosurgery    4. Chronic right-sided low back pain with right-sided sciatica  -     diclofenac (VOLTAREN) 50 MG EC tablet; Take 1 tablet by mouth 2 (Two) Times a Day As Needed (For sciatic pain).  Dispense: 60 tablet; Refill: 3  -     Ambulatory Referral to Neurosurgery    5. Abnormal MRI, lumbar spine  -     Ambulatory Referral to Neurosurgery      Start newly prescribed medications   Please call the office if you have issues     Most recent MRI reviewed     Referral to Neuro placed     She would like to avoid narcotic pain medication if possible.     Continue using heating pad on back           Follow Up   Return if symptoms worsen or fail to improve, for Next scheduled follow up.  Patient was given  instructions and counseling regarding her condition or for health maintenance advice. Please see specific information pulled into the AVS if appropriate.           This document has been electronically signed by YARI Chaudhari on August 22, 2023 12:19 CDT

## 2023-08-25 DIAGNOSIS — M54.41 CHRONIC BILATERAL LOW BACK PAIN WITH BILATERAL SCIATICA: Primary | ICD-10-CM

## 2023-08-25 DIAGNOSIS — M54.42 CHRONIC BILATERAL LOW BACK PAIN WITH BILATERAL SCIATICA: Primary | ICD-10-CM

## 2023-08-25 DIAGNOSIS — G89.29 CHRONIC BILATERAL LOW BACK PAIN WITH BILATERAL SCIATICA: Primary | ICD-10-CM

## 2023-09-19 ENCOUNTER — HOSPITAL ENCOUNTER (OUTPATIENT)
Dept: MRI IMAGING | Facility: HOSPITAL | Age: 53
Discharge: HOME | End: 2023-09-19
Admitting: NURSE PRACTITIONER
Payer: MEDICAID

## 2023-09-19 DIAGNOSIS — M54.41 CHRONIC BILATERAL LOW BACK PAIN WITH BILATERAL SCIATICA: ICD-10-CM

## 2023-09-19 DIAGNOSIS — G89.29 CHRONIC BILATERAL LOW BACK PAIN WITH BILATERAL SCIATICA: ICD-10-CM

## 2023-09-19 DIAGNOSIS — M54.42 CHRONIC BILATERAL LOW BACK PAIN WITH BILATERAL SCIATICA: ICD-10-CM

## 2023-09-19 PROCEDURE — 72148 MRI LUMBAR SPINE W/O DYE: CPT

## 2023-09-28 DIAGNOSIS — M54.42 CHRONIC BILATERAL LOW BACK PAIN WITH BILATERAL SCIATICA: ICD-10-CM

## 2023-09-28 DIAGNOSIS — G89.29 CHRONIC BILATERAL LOW BACK PAIN WITH BILATERAL SCIATICA: ICD-10-CM

## 2023-09-28 DIAGNOSIS — R93.7 ABNORMAL MRI, LUMBAR SPINE: Primary | ICD-10-CM

## 2023-09-28 DIAGNOSIS — M54.41 CHRONIC BILATERAL LOW BACK PAIN WITH BILATERAL SCIATICA: ICD-10-CM

## 2023-09-29 ENCOUNTER — TELEPHONE (OUTPATIENT)
Dept: FAMILY MEDICINE CLINIC | Facility: CLINIC | Age: 53
End: 2023-09-29

## 2023-09-29 DIAGNOSIS — M25.551 RIGHT HIP PAIN: Primary | ICD-10-CM

## 2023-09-29 NOTE — TELEPHONE ENCOUNTER
Sangita Pradhan called and said that Yodit had sent her for an MRI for her back.  She had it done and wants to know where she goes from her.  Does she come back to see Yodit or does Yodit refer her to a back specialist?    Pt call back [phone]

## 2025-05-29 ENCOUNTER — TELEPHONE (OUTPATIENT)
Dept: NEUROSURGERY | Age: 55
End: 2025-05-29

## 2025-07-01 ENCOUNTER — OFFICE VISIT (OUTPATIENT)
Age: 55
End: 2025-07-01
Payer: MEDICAID

## 2025-07-01 VITALS — BODY MASS INDEX: 36.73 KG/M2 | WEIGHT: 234 LBS | HEIGHT: 67 IN

## 2025-07-01 DIAGNOSIS — Z01.812 PRE-OPERATIVE LABORATORY EXAMINATION: ICD-10-CM

## 2025-07-01 DIAGNOSIS — M16.11 PRIMARY OSTEOARTHRITIS OF RIGHT HIP: ICD-10-CM

## 2025-07-01 DIAGNOSIS — Z01.810 PRE-OPERATIVE CARDIOVASCULAR EXAMINATION: ICD-10-CM

## 2025-07-01 DIAGNOSIS — M25.551 RIGHT HIP PAIN: Primary | ICD-10-CM

## 2025-07-01 DIAGNOSIS — Z29.9 PROPHYLACTIC MEASURE: ICD-10-CM

## 2025-07-01 PROCEDURE — 99205 OFFICE O/P NEW HI 60 MIN: CPT | Performed by: ORTHOPAEDIC SURGERY

## 2025-07-01 RX ORDER — MUPIROCIN 2 %
OINTMENT (GRAM) TOPICAL
Qty: 1 EACH | Refills: 0 | Status: SHIPPED | OUTPATIENT
Start: 2025-07-01

## 2025-07-01 RX ORDER — FLUOXETINE 10 MG/1
10 CAPSULE ORAL DAILY
COMMUNITY

## 2025-07-01 RX ORDER — IBUPROFEN 800 MG/1
800 TABLET, FILM COATED ORAL EVERY 6 HOURS PRN
COMMUNITY

## 2025-07-01 NOTE — PROGRESS NOTES
include DVT,  pulmonary embolus and possible death.    Specific to total hip replacements:  We talked to the patient about the 3 major goals which are as follows: 1. To get components to fit well 2.  Hip stability and  3.  Is equalization of leg lengths.  I also went on to tell them that the leg length equalization is the lowest priority of the 3 as we often have to lengthen or shorten the operative leg relative to the nonoperative leg to achieve proper stability during the surgery.  There is a 1 out of 4 chance that the operative leg will feel longer or shorter than the nonoperative leg.  No follow-ups on file.        Patient given educational materials - see patient instructions.   Discussed use, benefit, and side effects of prescribed medications.  All patient questions answered.  Pt voiced understanding. Patient agreed with treatment plan. Follow up as needed.    This dictation was generated by voice recognition computer software. Although all attempts are made to edit the dictation for accuracy, there may be errors in the transcription that are not intended.    The patient (or guardian, if applicable) and other individuals in attendance with the patient were advised that Artificial Intelligence will be utilized during this visit to record, process the conversation to generate a clinical note, and support improvement of the AI technology. The patient (or guardian, if applicable) and other individuals in attendance at the appointment consented to the use of AI, including the recording.                   Electronically signed by Jeanmarie Albert MD on 7/1/2025 at 2:06 PM.

## 2025-07-14 ENCOUNTER — OFFICE VISIT (OUTPATIENT)
Dept: NEUROSURGERY | Age: 55
End: 2025-07-14
Payer: MEDICAID

## 2025-07-14 ENCOUNTER — TELEPHONE (OUTPATIENT)
Age: 55
End: 2025-07-14

## 2025-07-14 VITALS
DIASTOLIC BLOOD PRESSURE: 80 MMHG | SYSTOLIC BLOOD PRESSURE: 112 MMHG | BODY MASS INDEX: 36.71 KG/M2 | WEIGHT: 233.91 LBS | HEIGHT: 67 IN | HEART RATE: 70 BPM

## 2025-07-14 DIAGNOSIS — M54.12 CERVICAL RADICULOPATHY: ICD-10-CM

## 2025-07-14 DIAGNOSIS — M51.362 DEGENERATION OF INTERVERTEBRAL DISC OF LUMBAR REGION WITH DISCOGENIC BACK PAIN AND LOWER EXTREMITY PAIN: Primary | ICD-10-CM

## 2025-07-14 DIAGNOSIS — M54.16 LUMBAR RADICULOPATHY: ICD-10-CM

## 2025-07-14 PROCEDURE — 99204 OFFICE O/P NEW MOD 45 MIN: CPT | Performed by: NEUROLOGICAL SURGERY

## 2025-07-14 RX ORDER — ERGOCALCIFEROL 1.25 MG/1
CAPSULE, LIQUID FILLED ORAL
COMMUNITY
Start: 2025-07-10

## 2025-07-14 RX ORDER — BUPROPION HYDROCHLORIDE 150 MG/1
150 TABLET ORAL EVERY MORNING
COMMUNITY
Start: 2025-07-11 | End: 2026-07-12

## 2025-07-14 ASSESSMENT — ENCOUNTER SYMPTOMS
BACK PAIN: 1
RESPIRATORY NEGATIVE: 1
GASTROINTESTINAL NEGATIVE: 1
EYES NEGATIVE: 1

## 2025-07-14 NOTE — PROGRESS NOTES
[facility] Neurosurgery  Office Visit        Chief Complaint   Patient presents with    New Patient     Establishing care    Results     Imaging in media    Back Pain     Patient is here for back pain that started 3 years ago.  She currently goes to PM, without relief, but not PT. She is ambulating with Rolator walker today.     Numbness     Patient complains of left arm numbness and weakness.        HISTORY OF PRESENT ILLNESS:      The patient is a 54 y.o. female who presents for neurosurgical evaluation of back pain.  She also complains of severe pain in her right hip and she was recently seen by Dr. Albert and he is planning right hip replacement in 2025.  She complains of midline and paraspinal lower back pain with pain radiating down both legs, left worse than right.  She complains of cramping in her left leg when she walks.  She also complains of neck pain and numbness in her left arm.  She is a smoker and is quitting at the request of Dr. Albert prior to her hip surgery.  She is currently in pain management.       MEDICAL HISTORY:             No past medical history on file.    Past Surgical History:   Procedure Laterality Date    ARM SURGERY Left      SECTION      TONSILLECTOMY           Current Outpatient Medications:     buPROPion (WELLBUTRIN XL) 150 MG extended release tablet, Take 1 tablet by mouth every morning, Disp: , Rfl:     vitamin D (ERGOCALCIFEROL) 1.25 MG (82991 UT) CAPS capsule, TAKE ONE (1) CAPSULE BY MOUTH EVERY 7 DAYS, Disp: , Rfl:     FLUoxetine (PROZAC) 10 MG capsule, Take 1 capsule by mouth daily, Disp: , Rfl:     ibuprofen (ADVIL;MOTRIN) 800 MG tablet, Take 1 tablet by mouth every 6 hours as needed for Pain, Disp: , Rfl:     mupirocin (BACTROBAN) 2 % ointment, Swab each nostril bid x 7 days pre op and am of surgery., Disp: 1 each, Rfl: 0    naloxone 4 MG/0.1ML LIQD nasal spray, 1 spray by Nasal route as needed for Opioid Reversal, Disp: 1 each, Rfl: 0    Allergies:  Codeine    Social

## 2025-07-14 NOTE — TELEPHONE ENCOUNTER
Called patient and go no answer, called her sister just to remind them that patient would need to stop smoking by this Friday to keep her surgery date on 08/29/25.

## 2025-08-08 ENCOUNTER — TELEPHONE (OUTPATIENT)
Age: 55
End: 2025-08-08